# Patient Record
Sex: MALE | Race: WHITE | NOT HISPANIC OR LATINO | Employment: OTHER | URBAN - METROPOLITAN AREA
[De-identification: names, ages, dates, MRNs, and addresses within clinical notes are randomized per-mention and may not be internally consistent; named-entity substitution may affect disease eponyms.]

---

## 2017-04-05 NOTE — PROGRESS NOTES
SUBJECTIVE:                                                    Hermelindo Conley is a 57 year old male who presents to clinic today for the following health issues:    Pt new to clinic. Previous care at [facility] - see Care Everywhere    Musculoskeletal problem/pain      Duration: 1.5 years ago     Description  Location: right shoulder    Intensity:  severe    Accompanying signs and symptoms: radiation of pain to mid humerus    History  Previous similar problem: YES- left shoulder  Previous evaluation:  none    Precipitating or alleviating factors:  Trauma or overuse: no   Aggravating factors include: lifting, exercise and overuse    Therapies tried and outcome: rest/inactivity, heat and ice    5-6 years ago, fell of of a 10 foot ladder - landed on top of scalp and split open a large area. Stapled scalp back together. Was told had pinched nerves in neck. Went to rehab and continued to have left shoulder pain. Had EMG of arm - had no feeling in left hand. Showed problem with ulnar nerve. Saw orthopedics and rerouted ulnar nerve and cleaned up elbow. Symptoms improved. Continued to have left shoulder pain. Tried steroid injections with Dr. Barth. Had PT.  Had MRI and found inflammation in shoulder thought due to fall. Had shoulder cleaned out at [employer]. Left shoulder has been fine since that time.    About 3 weeks ago, right shoulder started similar to how left started. Has been gradually worsening and now having difficulty putting coat on. Noticed in morning with lateral humeral pain. If moves in a certain way, pain radiates down arm.      smoking cessation: used patches for a long time and quit for 1.5-2 years. And then restarted smoking. Now smoking about 1 pack a day. Started in college in late 70's. Smoked about 4-5/day until last year.     Sleep - sleeps for 1.5-2 hours, awake for 2-3 hours and then goes back to sleep. Was started on trazodone in the past and knocked out. Was not tired in the morning.  "Doesn't snore. Was on fairly high dose Mother with history of sleep apnea.    Reviewed and updated as needed this visit by clinical staff  Tobacco  Allergies  Meds  Problems  Med Hx  Surg Hx  Fam Hx  Soc Hx        Reviewed and updated as needed this visit by Provider  Allergies  Meds  Problems       -------------------------------------    Problem list and histories reviewed & adjusted, as indicated.  Additional history: as documented    ROS:  Constitutional, HEENT, cardiovascular, pulmonary, endocrine, musculoskeletal, neurologic, gi and gu systems are negative, except as otherwise noted.    Problem list, Medication list, Allergies, and Medical/Social/Surgical histories reviewed in [facility] and updated as appropriate.    OBJECTIVE:                                                    /72 (BP Location: Right arm, Patient Position: Chair, Cuff Size: Adult Regular)  Pulse 77  Temp 97  F (36.1  C) (Tympanic)  Ht 5' 8.5\" (1.74 m)  Wt 149 lb 3.2 oz (67.7 kg)  SpO2 99%  BMI 22.36 kg/m2   Body mass index is 22.36 kg/(m^2).  General Appearance: healthy, alert and no distress  Eyes:   no discharge, erythema.  Normal pupils.  Musculoskeletal: right shoulder with decreased internal rotation. Tenderness to palpation over lateral upper arm. Pain worse with pulling arm across body. Normal strength, but pain with testing supraspinatus.  Skin: no rashes or lesions.  Well perfused and normal turgor.    Diagnostic Test Results:  none      ASSESSMENT/PLAN:                                                      (Z72.0) Tobacco abuse  (primary encounter diagnosis)  Comment: interested in quitting.   Plan: varenicline (CHANTIX STARTING MONTH PAK) 0.5 MG        X 11 & 1 MG X 42 tablet, varenicline (CHANTIX)         1 MG tablet  - discussed options for smoking cessation  - pt would like to start chantix - discussed possible side effects. Will pick quit date and start medication 1 week prior    (F51.01) Primary " insomnia  Comment: not well controlled, did well with trazodone in past but using fairly large doses  Plan: traZODone (DESYREL) 50 MG tablet  - will start  mg at bedtime  - if necessary, can increase to 150 or 200 mg    (S46.001A) Rotator cuff injury, right, initial encounter  Comment: symptoms most c/w rotator cuff injury, but could also be due to pinched nerve in neck with pain radiating down arm. Exam more c/w shoulder pathology. Did well with injection on other side.  Plan: ORTHO  REFERRAL  - will have see sports medicine for eval and possible injection     Tobacco Cessation:   reports that he has been smoking Cigarettes.  He does not have any smokeless tobacco history on file.  Tobacco Cessation Action Plan: Pharmacotherapies : Chantix      Follow up with Provider - for annual visit and as needed     [facility] [place]

## 2017-04-06 ENCOUNTER — OFFICE VISIT (OUTPATIENT)
Dept: PEDIATRICS | Facility: CLINIC | Age: 57
End: 2017-04-06
Payer: COMMERCIAL

## 2017-04-06 VITALS
DIASTOLIC BLOOD PRESSURE: 72 MMHG | WEIGHT: 149.2 LBS | OXYGEN SATURATION: 99 % | HEIGHT: 69 IN | SYSTOLIC BLOOD PRESSURE: 122 MMHG | TEMPERATURE: 97 F | HEART RATE: 77 BPM | BODY MASS INDEX: 22.1 KG/M2

## 2017-04-06 DIAGNOSIS — S46.001A ROTATOR CUFF INJURY, RIGHT, INITIAL ENCOUNTER: ICD-10-CM

## 2017-04-06 DIAGNOSIS — Z72.0 TOBACCO ABUSE: Primary | ICD-10-CM

## 2017-04-06 DIAGNOSIS — F51.01 PRIMARY INSOMNIA: ICD-10-CM

## 2017-04-06 PROCEDURE — 99203 OFFICE O/P NEW LOW 30 MIN: CPT | Performed by: INTERNAL MEDICINE

## 2017-04-06 RX ORDER — TRAZODONE HYDROCHLORIDE 50 MG/1
50-100 TABLET, FILM COATED ORAL
Qty: 60 TABLET | Refills: 4 | Status: SHIPPED | OUTPATIENT
Start: 2017-04-06 | End: 2018-09-13

## 2017-04-06 RX ORDER — VARENICLINE TARTRATE 1 MG/1
1 TABLET, FILM COATED ORAL 2 TIMES DAILY
Qty: 56 TABLET | Refills: 2 | Status: SHIPPED | OUTPATIENT
Start: 2017-04-06 | End: 2018-08-30

## 2017-04-06 NOTE — PATIENT INSTRUCTIONS
1. For smoking  - pick quit date - start chantix 1 week before quit date  - can continue 3-6 months if needed  2. Trazodone  mg at bedtime as needed  3. You will be contacted to set up an appointment with sports medicine to evaluation shoulder and for possible injection

## 2017-04-06 NOTE — MR AVS SNAPSHOT
After Visit Summary   4/6/2017    Hermelindo Conley    MRN: [id number]           Patient Information     Date Of Birth          1960        Visit Information        Provider Department      4/6/2017 10:40 AM Aditi Coronel MD [facility] [place]        Today's Diagnoses     Tobacco abuse    -  1    Primary insomnia        Rotator cuff injury, right, initial encounter          Care Instructions    1. For smoking  - pick quit date - start chantix 1 week before quit date  - can continue 3-6 months if needed  2. Trazodone  mg at bedtime as needed  3. You will be contacted to set up an appointment with sports medicine to evaluation shoulder and for possible injection        Follow-ups after your visit        Additional Services     ORTHO  REFERRAL       [facility] is referring you to the Orthopedic  Services at [facility].       The  Representative will assist you in the coordination of your Orthopedic and Musculoskeletal Care as prescribed by your physician.    The  Representative will call you within 1 business day to help schedule your appointment, or you may contact the  Representative at:    All areas ~ [phone]     Type of Referral : Non Surgical       Timeframe requested: Within 2 weeks    Coverage of these services is subject to the terms and limitations of your health insurance plan.  Please call member services at your health plan with any benefit or coverage questions.      If X-rays, CT or MRI's have been performed, please contact the facility where they were done to arrange for , prior to your scheduled appointment.  Please bring this referral request to your appointment and present it to your specialist.                  Who to contact     If you have questions or need follow up information about today's clinic visit or your schedule please contact [facility] [place] directly  "at [phone].  Normal or non-critical lab and imaging results will be communicated to you by MyChart, letter or phone within 4 business days after the clinic has received the results. If you do not hear from us within 7 days, please contact the clinic through [employer] or phone. If you have a critical or abnormal lab result, we will notify you by phone as soon as possible.  Submit refill requests through [employer] or call your pharmacy and they will forward the refill request to us. Please allow 3 business days for your refill to be completed.          Additional Information About Your Visit        [email or web address] ([email or web address]) Information     [employer] lets you send messages to your doctor, view your test results, renew your prescriptions, schedule appointments and more. To sign up, go to [email or web address] . Click on \"Log in\" on the left side of the screen, which will take you to the Welcome page. Then click on \"Sign up Now\" on the right side of the page.     You will be asked to enter the access code listed below, as well as some personal information. Please follow the directions to create your username and password.     Your access code is: [email or web address]  Expires: 2017 11:20 AM     Your access code will  in 90 days. If you need help or a new code, please call your [facility] clinic or [phone].        Care EveryWhere ID     This is your Care EveryWhere ID. This could be used by other organizations to access your [facility] medical records  [id number]        Your Vitals Were     Pulse Temperature Height Pulse Oximetry BMI (Body Mass Index)       77 97  F (36.1  C) (Tympanic) 5' 8.5\" (1.74 m) 99% 22.36 kg/m2        Blood Pressure from Last 3 Encounters:   17 122/72   14 135/83    Weight from Last 3 Encounters:   17 149 lb 3.2 oz (67.7 kg)              We Performed the Following     ORTHO  REFERRAL          Today's Medication Changes          These changes are accurate as of: 17 11:20 AM.  If " you have any questions, ask your nurse or doctor.               Start taking these medicines.        Dose/Directions    traZODone 50 MG tablet   Commonly known as:  DESYREL   Used for:  Primary insomnia   Started by:  Aditi Coronel MD        Dose:   mg   Take 1-2 tablets ( mg) by mouth nightly as needed for sleep   Quantity:  60 tablet   Refills:  4       * varenicline 0.5 MG X 11 & 1 MG X 42 tablet   Commonly known as:  CHANTIX STARTING MONTH PAK   Used for:  Tobacco abuse   Started by:  Aditi Coronel MD        Take 0.5 mg tab daily for 3 days, then 0.5 mg tab twice daily for 4 days, then 1 mg twice daily.   Quantity:  53 tablet   Refills:  0       * varenicline 1 MG tablet   Commonly known as:  CHANTIX   Used for:  Tobacco abuse   Started by:  Aditi Coronel MD        Dose:  1 mg   Take 1 tablet (1 mg) by mouth 2 times daily   Quantity:  56 tablet   Refills:  2       * Notice:  This list has 2 medication(s) that are the same as other medications prescribed for you. Read the directions carefully, and ask your doctor or other care provider to review them with you.         Where to get your medicines      These medications were sent to [place] [address] [place] [facility] [place], [place] - [address] [facility]  [address] [facility] [place] [address]     Phone:  [phone]     traZODone 50 MG tablet    varenicline 0.5 MG X 11 & 1 MG X 42 tablet    varenicline 1 MG tablet                Primary Care Provider Office Phone # Fax #    Aditi Coronel -[phone]       [address] [place] [address]        Thank you!     Thank you for choosing [facility]  for your care. Our goal is always to provide you with excellent care. Hearing back from our patients is one way we can continue to improve our services. Please take a few minutes to complete the written survey that you may receive in the mail after your visit with us. Thank you!              Your Updated Medication List - Protect others around you: Learn how to safely use, store and throw away your medicines at www.disposemymeds.org.          This list is accurate as of: 4/6/17 11:20 AM.  Always use your most recent med list.                   Brand Name Dispense Instructions for use    traZODone 50 MG tablet    DESYREL    60 tablet    Take 1-2 tablets ( mg) by mouth nightly as needed for sleep       * varenicline 0.5 MG X 11 & 1 MG X 42 tablet    CHANTIX STARTING MONTH MYNOR    53 tablet    Take 0.5 mg tab daily for 3 days, then 0.5 mg tab twice daily for 4 days, then 1 mg twice daily.       * varenicline 1 MG tablet    CHANTIX    56 tablet    Take 1 tablet (1 mg) by mouth 2 times daily       * Notice:  This list has 2 medication(s) that are the same as other medications prescribed for you. Read the directions carefully, and ask your doctor or other care provider to review them with you.

## 2017-04-08 ENCOUNTER — RADIANT APPOINTMENT (OUTPATIENT)
Dept: GENERAL RADIOLOGY | Facility: CLINIC | Age: 57
End: 2017-04-08
Attending: FAMILY MEDICINE
Payer: COMMERCIAL

## 2017-04-08 ENCOUNTER — OFFICE VISIT (OUTPATIENT)
Dept: ORTHOPEDICS | Facility: CLINIC | Age: 57
End: 2017-04-08
Payer: COMMERCIAL

## 2017-04-08 VITALS
WEIGHT: 150 LBS | BODY MASS INDEX: 22.22 KG/M2 | DIASTOLIC BLOOD PRESSURE: 76 MMHG | SYSTOLIC BLOOD PRESSURE: 122 MMHG | HEIGHT: 69 IN

## 2017-04-08 DIAGNOSIS — M25.511 RIGHT SHOULDER PAIN, UNSPECIFIED CHRONICITY: ICD-10-CM

## 2017-04-08 DIAGNOSIS — M25.511 CHRONIC RIGHT SHOULDER PAIN: Primary | ICD-10-CM

## 2017-04-08 DIAGNOSIS — G89.29 CHRONIC RIGHT SHOULDER PAIN: Primary | ICD-10-CM

## 2017-04-08 PROCEDURE — 99244 OFF/OP CNSLTJ NEW/EST MOD 40: CPT | Performed by: FAMILY MEDICINE

## 2017-04-08 PROCEDURE — 73030 X-RAY EXAM OF SHOULDER: CPT | Mod: RT

## 2017-04-08 NOTE — PROGRESS NOTES
"ASSESSMENT & PLAN    ICD-10-CM    1. Chronic right shoulder pain M25.511 XR Shoulder Right G/E 3 Views    G89.29 MR Shoulder Right w/o Contrast   Presents with chronic right shoulder pain without a specific injury  Exam does show some cuff weakness and ? Supraspinatus atrophy  Xrays reviewed - well preserved GH space  Discussed given chronicity and cuff weakness will access with MRI and determine next steps.  Trail of cortisone and PT vs surgical referral.    Follow up 2 days after MRI to discuss results or sooner if needed. Call direct clinic number [phone] at any time with questions or concerns. Instructed to call the office if the condition evolves or worsens.    -----    SUBJECTIVE  Hermelindo Conley is a/an 57 year old right hand dominant male who is seen in consultation at the request of Dr. Coronel for evaluation of right lateral shoulder pain. The patient is seen by themselves.    Onset: 1.5 month(s) ago. Reports insidious onset without acute precipitating event. He reports waking up with pain one morning.  Worsened by: lifting, getting dressed, sleeping on it  Better with: at the side  Quality: sharp, burning  Pain Scale (maximum/current)/10: 8/10 / 9/10  Treatments tried: rest/activity avoidance, ice and heat  Orthopedic history: NO  Relevant surgical history: NO  Patient Social History: works as [employer] and [employer]    Patient's past medical, surgical, social, and family histories were reviewed today and no changes are noted.    REVIEW OF SYSTEMS:  10 point ROS is negative other than symptoms noted above in HPI, Past Medical History or as stated below  Constitutional: NEGATIVE for fever, chills, change in weight  Skin: NEGATIVE for worrisome rashes, moles or lesions  GI/: NEGATIVE for bowel or bladder changes  Neuro: NEGATIVE for weakness, dizziness or paresthesias    OBJECTIVE:  Ht 5' 9\" (1.753 m)  Wt 150 lb (68 kg)  BMI 22.15 kg/m2   General: healthy, alert and in no distress  HEENT: no scleral icterus " or conjunctival erythema  Skin: no suspicious lesions or rash. No jaundice.  CV: regular rhythm by palpation  Resp: normal respiratory effort without conversational dyspnea   Psych: normal mood and affect  Gait: normal steady gait with appropriate coordination and balance  Neuro: Motor strength as noted below.  MSK:  RIGHT SHOULDER  Inspection:    no asymmetry, mild supraspinatus atrophy  Palpation:    Pain along deltoid. Remainder of bony and tendinous landmarks are nontender.  Active Range of Motion:     Abduction 900, FF 1050, , IR very limited.    Strength:    Scapular plane abduction 4+/5,  ER 5/5, IR 5/5, biceps 5/5  Special Tests:    Positive: supraspinatus (empty can) and Speed's, hawkings    Negative: drop arm and crossed arm adduction    Independent visualization of the below image:  Well preserved glenohumeral and a.c. joint joint. Hooked acromion.  No other acute osseous findings  Final radiology read pending    Patient's conditions were thoroughly discussed during today's visit with greater than 50% of the visit spent counseling the patient with total time spent face-to-face with the patient being 25 minutes.    Mykel De La Rosa DO [facility]

## 2017-04-08 NOTE — MR AVS SNAPSHOT
After Visit Summary   4/8/2017    Hermelindo Conley    MRN: [id number]           Patient Information     Date Of Birth          1960        Visit Information        Provider Department      4/8/2017 9:20 AM Mykel De La Rosa DO [facility]        Today's Diagnoses     Chronic right shoulder pain    -  1      Care Instructions    Thank you for allowing us to participate in your care today.  Please find below your visit diagnosis and the plan going forward.    1. Chronic right shoulder pain      Discussed exam and history  MRI of your right shoulder has been ordered. Schedule with [facility] ([phone]). Once you know the date of your MRI, please call my office and schedule a follow-up visit for 2 days after that.    Follow up to discuss results of MRI or sooner if needed. Call direct clinic number [[phone]] at any time with questions or concerns.    Mykel De La Rosa DO CAQSM  [facility]  Website: [email or web address]  Twitter: [email or web address]          Follow-ups after your visit        Future tests that were ordered for you today     Open Future Orders        Priority Expected Expires Ordered    MR Shoulder Right w/o Contrast Routine  4/9/2018 4/8/2017            Who to contact     If you have questions or need follow up information about today's clinic visit or your schedule please contact [facility] directly at [phone].  Normal or non-critical lab and imaging results will be communicated to you by MyChart, letter or phone within 4 business days after the clinic has received the results. If you do not hear from us within 7 days, please contact the clinic through MyChart or phone. If you have a critical or abnormal lab result, we will notify you by phone as soon as possible.  Submit refill requests through [employer] or call your pharmacy and they will forward the refill request to us. Please allow 3 business days for  "your refill to be completed.          Additional Information About Your Visit        [employer] Information     [employer] lets you send messages to your doctor, view your test results, renew your prescriptions, schedule appointments and more. To sign up, go to [email or web address] . Click on \"Log in\" on the left side of the screen, which will take you to the Welcome page. Then click on \"Sign up Now\" on the right side of the page.     You will be asked to enter the access code listed below, as well as some personal information. Please follow the directions to create your username and password.     Your access code is: [email or web address]  Expires: 2017 11:20 AM     Your access code will  in 90 days. If you need help or a new code, please call your [facility] clinic or [phone].        Care EveryWhere ID     This is your Care EveryWhere ID. This could be used by other organizations to access your [facility] medical records  [id number]        Your Vitals Were     Height BMI (Body Mass Index)                5' 9\" (1.753 m) 22.15 kg/m2           Blood Pressure from Last 3 Encounters:   17 122/76   17 122/72   14 135/83    Weight from Last 3 Encounters:   17 150 lb (68 kg)   17 149 lb 3.2 oz (67.7 kg)               Primary Care Provider Office Phone # Fax #    Aditi Coronel [phone]       [facility] [address] [place] [address]        Thank you!     Thank you for choosing [facility]  for your care. Our goal is always to provide you with excellent care. Hearing back from our patients is one way we can continue to improve our services. Please take a few minutes to complete the written survey that you may receive in the mail after your visit with us. Thank you!             Your Updated Medication List - Protect others around you: Learn how to safely use, store and throw away your medicines at www.disposemymeds.org. "          This list is accurate as of: 4/8/17  9:49 AM.  Always use your most recent med list.                   Brand Name Dispense Instructions for use    traZODone 50 MG tablet    DESYREL    60 tablet    Take 1-2 tablets ( mg) by mouth nightly as needed for sleep       * varenicline 0.5 MG X 11 & 1 MG X 42 tablet    CHANTIX STARTING MONTH MYNOR    53 tablet    Take 0.5 mg tab daily for 3 days, then 0.5 mg tab twice daily for 4 days, then 1 mg twice daily.       * varenicline 1 MG tablet    CHANTIX    56 tablet    Take 1 tablet (1 mg) by mouth 2 times daily       * Notice:  This list has 2 medication(s) that are the same as other medications prescribed for you. Read the directions carefully, and ask your doctor or other care provider to review them with you.

## 2017-04-08 NOTE — NURSING NOTE
"Chief Complaint   Patient presents with     Musculoskeletal Problem       Initial /76  Ht 5' 9\" (1.753 m)  Wt 150 lb (68 kg)  BMI 22.15 kg/m2 Estimated body mass index is 22.15 kg/(m^2) as calculated from the following:    Height as of this encounter: 5' 9\" (1.753 m).    Weight as of this encounter: 150 lb (68 kg).  Medication Reconciliation: complete     Jose Brooks, ATC    "

## 2017-04-08 NOTE — PATIENT INSTRUCTIONS
Thank you for allowing us to participate in your care today.  Please find below your visit diagnosis and the plan going forward.    1. Chronic right shoulder pain      Discussed exam and history  MRI of your right shoulder has been ordered. Schedule with [facility] ([phone]). Once you know the date of your MRI, please call my office and schedule a follow-up visit for 2 days after that.    Follow up to discuss results of MRI or sooner if needed. Call direct clinic number [[phone]] at any time with questions or concerns.    Mykel De La Rosa DO CAQSM  [facility]  Website: [email or web address]  Twitter: [email or web address]

## 2017-04-08 NOTE — Clinical Note
Hermelindo Liz Dr. saw me at [facility] on Apr 8, 2017.  Please refer to the visit note at your convenience and feel free to contact me should you have any questions.  Thank you for the consult. Sincerely,  Mykel De La Rosa DO, [facility]

## 2017-04-09 ENCOUNTER — HOSPITAL ENCOUNTER (OUTPATIENT)
Dept: MRI IMAGING | Facility: CLINIC | Age: 57
Discharge: HOME | End: 2017-04-09
Attending: FAMILY MEDICINE | Admitting: FAMILY MEDICINE
Payer: COMMERCIAL

## 2017-04-09 DIAGNOSIS — G89.29 CHRONIC RIGHT SHOULDER PAIN: ICD-10-CM

## 2017-04-09 DIAGNOSIS — M25.511 CHRONIC RIGHT SHOULDER PAIN: ICD-10-CM

## 2017-04-09 PROCEDURE — 73221 MRI JOINT UPR EXTREM W/O DYE: CPT | Mod: RT

## 2017-04-13 ENCOUNTER — OFFICE VISIT (OUTPATIENT)
Dept: ORTHOPEDICS | Facility: CLINIC | Age: 57
End: 2017-04-13
Payer: COMMERCIAL

## 2017-04-13 VITALS
WEIGHT: 150 LBS | SYSTOLIC BLOOD PRESSURE: 124 MMHG | BODY MASS INDEX: 22.22 KG/M2 | HEIGHT: 69 IN | DIASTOLIC BLOOD PRESSURE: 74 MMHG

## 2017-04-13 DIAGNOSIS — G89.29 CHRONIC RIGHT SHOULDER PAIN: Primary | ICD-10-CM

## 2017-04-13 DIAGNOSIS — M25.511 CHRONIC RIGHT SHOULDER PAIN: Primary | ICD-10-CM

## 2017-04-13 DIAGNOSIS — M50.30 DEGENERATIVE CERVICAL DISC: ICD-10-CM

## 2017-04-13 PROCEDURE — 99213 OFFICE O/P EST LOW 20 MIN: CPT | Performed by: FAMILY MEDICINE

## 2017-04-13 RX ORDER — METHYLPREDNISOLONE ACETATE 40 MG/ML
40 INJECTION, SUSPENSION INTRA-ARTICULAR; INTRALESIONAL; INTRAMUSCULAR; SOFT TISSUE ONCE
Qty: 1 ML | Refills: 0 | Status: CANCELLED | OUTPATIENT
Start: 2017-04-13 | End: 2017-04-13

## 2017-04-13 NOTE — MR AVS SNAPSHOT
After Visit Summary   4/13/2017    Hermelindo Conley    MRN: [id number]           Patient Information     Date Of Birth          1960        Visit Information        Provider Department      4/13/2017 8:40 AM Mykel De La Rosa DO [facility]        Today's Diagnoses     Chronic right shoulder pain    -  1    Degenerative cervical disc          Care Instructions    Thank you for allowing us to participate in your care today.  Please find below your visit diagnosis and the plan going forward.    1. Chronic right shoulder pain    2. Degenerative cervical disc      Reviewed MRI - no cuff tear and no bursitis  Would not recommend cortisone injection into the shoulder  Reviewed old cervical CT and would recommend physical therapy for the cervical spine  Physical therapy: [facility] [phone]    If not improving could consider imaging for the neck    Follow up after 4-6 visits of therapy if not improving or sooner if needed. Call direct clinic number [[phone]] at any time with questions or concerns.    Mykel De La Rosa DO [facility]  Website: [email or web address]  Twitter: [email or web address]          Follow-ups after your visit        Additional Services     [facility], HAND, AND CHIROPRACTIC REFERRAL       **This order will print in the [facility] Scheduling Office**    Physical Therapy, Hand Therapy and Chiropractic Care are available through:    *[facility]  *[facility]  *[facility]    Call one number to schedule at any of the above locations: [phone].    Your provider has referred you to: Physical Therapy at [facility] or [facility] [place]    Indication/Reason for Referral: Perceives right shoulder pain but cervical degenerative change on CT, negative shoulder MRI, please address cervical spine  Onset of Illness: see chart  Therapy Orders: Evaluate and Treat  Special Programs:  "None  Special Request: None    Alessandra Aparicio      Additional Comments for the Therapist or Chiropractor:       Please be aware that coverage of these services is subject to the terms and limitations of your health insurance plan.  Call member services at your health plan with any benefit or coverage questions.      Please bring the following to your appointment:    *Your personal calendar for scheduling future appointments  *Comfortable clothing                  Who to contact     If you have questions or need follow up information about today's clinic visit or your schedule please contact [facility] directly at [phone].  Normal or non-critical lab and imaging results will be communicated to you by [employer], letter or phone within 4 business days after the clinic has received the results. If you do not hear from us within 7 days, please contact the clinic through [employer] or phone. If you have a critical or abnormal lab result, we will notify you by phone as soon as possible.  Submit refill requests through [employer] or call your pharmacy and they will forward the refill request to us. Please allow 3 business days for your refill to be completed.          Additional Information About Your Visit        [employer] Information     [employer] lets you send messages to your doctor, view your test results, renew your prescriptions, schedule appointments and more. To sign up, go to [email or web address] . Click on \"Log in\" on the left side of the screen, which will take you to the Welcome page. Then click on \"Sign up Now\" on the right side of the page.     You will be asked to enter the access code listed below, as well as some personal information. Please follow the directions to create your username and password.     Your access code is: [email or web address]  Expires: 2017 11:20 AM     Your access code will  in 90 days. If you need help or a new code, please call your [facility] clinic or " "[phone].        Care EveryWhere ID     This is your Care EveryWhere ID. This could be used by other organizations to access your [place] medical records  [id number]        Your Vitals Were     Height BMI (Body Mass Index)                5' 9\" (1.753 m) 22.15 kg/m2           Blood Pressure from Last 3 Encounters:   04/13/17 124/74   04/08/17 122/76   04/06/17 122/72    Weight from Last 3 Encounters:   04/13/17 150 lb (68 kg)   04/08/17 150 lb (68 kg)   04/06/17 149 lb 3.2 oz (67.7 kg)              We Performed the Following     [facility], HAND, AND CHIROPRACTIC REFERRAL        Primary Care Provider Office Phone # Fax #    Aditi Coronel [phone]       [address] [place] [address]        Thank you!     Thank you for choosing [facility]  for your care. Our goal is always to provide you with excellent care. Hearing back from our patients is one way we can continue to improve our services. Please take a few minutes to complete the written survey that you may receive in the mail after your visit with us. Thank you!             Your Updated Medication List - Protect others around you: Learn how to safely use, store and throw away your medicines at www.disposemymeds.org.          This list is accurate as of: 4/13/17  9:08 AM.  Always use your most recent med list.                   Brand Name Dispense Instructions for use    traZODone 50 MG tablet    DESYREL    60 tablet    Take 1-2 tablets ( mg) by mouth nightly as needed for sleep       * varenicline 0.5 MG X 11 & 1 MG X 42 tablet    CHANTIX STARTING MONTH MYNOR    53 tablet    Take 0.5 mg tab daily for 3 days, then 0.5 mg tab twice daily for 4 days, then 1 mg twice daily.       * varenicline 1 MG tablet    CHANTIX    56 tablet    Take 1 tablet (1 mg) by mouth 2 times daily       * Notice:  This list has 2 medication(s) that are the same as other medications prescribed for you. " Read the directions carefully, and ask your doctor or other care provider to review them with you.

## 2017-04-13 NOTE — PATIENT INSTRUCTIONS
Thank you for allowing us to participate in your care today.  Please find below your visit diagnosis and the plan going forward.    1. Chronic right shoulder pain    2. Degenerative cervical disc      Reviewed MRI - no cuff tear and no bursitis  Would not recommend cortisone injection into the shoulder  Reviewed old cervical CT and would recommend physical therapy for the cervical spine  Physical therapy: [facility] [phone]    If not improving could consider imaging for the neck    Follow up after 4-6 visits of therapy if not improving or sooner if needed. Call direct clinic number [[phone]] at any time with questions or concerns.    Mykel De La Rosa DO CAQSM  [facility]  Website: [email or web address]  Twitter: [email or web address]

## 2017-04-13 NOTE — PROGRESS NOTES
ASSESSMENT & PLAN    ICD-10-CM    1. Chronic right shoulder pain M25.511 [facility], AND CHIR[facility]RACTIC REFERRAL    G89.29    2. Degenerative cervical disc M50.30 [facility], AND CHIR[facility]RACTIC REFERRAL   Discussed MRI which shows moderate supraspinatus tendinopathy without any tearing. No subacromial bursitis and a well-preserved glenohumeral joint space.  Given these findings would not recommend cortisone injection, either subacromial or glenohumeral today.  He continues to report 8/10 pain down the lateral right arm.  Further discussed his known history of neck pain that had some radicular component on the left arm after his fall in 2014. At that time he had no right arm pain.  Question if the more recent right arm pain is more cervicogenic in nature.  Reviewed his CT neck which does show moderate degenerative change at C6-C7. C5-C6 looks fairly well preserved at that time.  Discussed a trial of physical therapy directed more towards the cervical spine. If he has minimal to no improvement could consider MRI of the neck to assess for progressive degenerative disc disease and for interventional consideration.    Follow up after 4-6 visits of therapy if not improving or sooner if needed. Call direct clinic number [[phone]] at any time with questions or concerns. Instructed to call the office if the condition evolves or worsens.    -----    SUBJECTIVE:  Hermelindo Conley is a 57 year old male who is seen in follow-up for chronic right shoulder pain . They were last seen 4/8/2017.     Since their last visit reports worsening pain. They indicate that their pain level is 8/10. They have tried heat.  Here to discuss MRI results.    Patient's past medical, surgical, social, and family histories were reviewed today and no changes are noted.    REVIEW OF SYSTEMS:  Constitutional: NEGATIVE for fever, chills, change in weight  Skin: NEGATIVE for worrisome rashes, moles or lesions  GI/: NEGATIVE for bowel or bladder  "changes  Neuro: NEGATIVE for weakness, dizziness or paresthesias    OBJECTIVE:  /74  Ht 5' 9\" (1.753 m)  Wt 150 lb (68 kg)  BMI 22.15 kg/m2   General: healthy, alert and in no distress  HEENT: no scleral icterus or conjunctival erythema  Skin: no suspicious lesions or rash. No jaundice.  CV: regular rhythm by palpation  Resp: normal respiratory effort without conversational dyspnea   Psych: normal mood and affect  Gait: normal steady gait with appropriate coordination and balance  Neuro: normal light touch sensory exam of the bilateral upper extremities.  Motor strength as noted below  MSK:  CERVICAL SPINE  Inspection:    Rounded shoulder, forward head  Palpation:    NonTender about the cervical spinous processes, paracervical musculature (bilateral) and medial border of scapula. Otherwise remainder of the landmarks and nontender.  Range of Motion:     Flexion full    Extension to ~5 degrees    Right and left side bend to ~10 degrees    Right rotation to ~ 30 degrees    Left rotation to ~ 20 degrees  Strength:    Deltoid (C5) 5/5, biceps (C6) 5/5, wrist extension (C6) 5/5, triceps (C7) 5/5, wrist flexion (C7) 5/5  Special Tests:    Negative: Spurling's (bilateral)    Independent visualization of the below image:  MRI RIGHT SHOULDER WITHOUT CONTRAST 4/9/2017 9:19 AM     HISTORY: Two months of right shoulder pain and loss of strength. The  concern is for rotator cuff pathology.     COMPARISON: Radiographs on 4/8/2017.     TECHNIQUE: Coronal T1, T2, and T2 fat-suppressed, sagittal T2, and  transverse proton density and T2-weighted images were obtained through  the right shoulder.     FINDINGS:  Osseous acromion outlet: There is a type II configuration of the  acromion with no significant lateral or anterior downsloping. There  are mild degenerative changes of the acromioclavicular joint. The  outlet is widely patent. No os acromiale.     Rotator cuff: There is moderate increased signal within and " thickening  of the distal fibers of the supraspinatus tendon. This does not  involve the surface of the tendon and does not represent a tear. The  remaining rotator cuff structures are intact and unremarkable. No  fatty atrophy of the musculature is seen.      Labrum: No tear or other labral pathology is demonstrated.     Biceps tendon: The biceps tendon is in the bicipital groove. It is  intact and demonstrates normal signal.     Osseous structures and cartilaginous surfaces: No fracture or osseous  lesion is seen. There is moderate resorptive change in the posterior  aspect of the greater tuberosity. No other abnormal marrow signal  intensity is identified. The cartilage surfaces are well preserved.     Additional findings: No joint effusion is demonstrated. There is no  fluid within the subacromial-subdeltoid bursa. The adjacent soft  tissues are unremarkable.     IMPRESSION:   1. Moderate tendinosis of the supraspinatus. No rotator cuff tear is  seen.  2. Mild degenerative changes of the acromioclavicular joint.     PAVAN HOLBROOK MD    Patient's conditions were thoroughly discussed during today's visit with greater than 50% of the visit spent counseling the patient with total time spent face-to-face with the patient being 20 minutes.    Mykel De La Rosa, DO [facility]

## 2017-04-13 NOTE — NURSING NOTE
"Chief Complaint   Patient presents with     RECHECK       Initial /74  Ht 5' 9\" (1.753 m)  Wt 150 lb (68 kg)  BMI 22.15 kg/m2 Estimated body mass index is 22.15 kg/(m^2) as calculated from the following:    Height as of this encounter: 5' 9\" (1.753 m).    Weight as of this encounter: 150 lb (68 kg).  Medication Reconciliation: complete       Jose Brooks, ATC    "

## 2017-04-17 ENCOUNTER — THERAPY VISIT (OUTPATIENT)
Dept: PHYSICAL THERAPY | Facility: CLINIC | Age: 57
End: 2017-04-17
Payer: COMMERCIAL

## 2017-04-17 DIAGNOSIS — M25.511 PAIN IN JOINT OF RIGHT SHOULDER: Primary | ICD-10-CM

## 2017-04-17 DIAGNOSIS — M47.812 CERVICAL SPINE DEGENERATION: ICD-10-CM

## 2017-04-17 PROCEDURE — 97140 MANUAL THERAPY 1/> REGIONS: CPT | Mod: GP | Performed by: PHYSICAL THERAPIST

## 2017-04-17 PROCEDURE — 97110 THERAPEUTIC EXERCISES: CPT | Mod: GP | Performed by: PHYSICAL THERAPIST

## 2017-04-17 PROCEDURE — 97161 PT EVAL LOW COMPLEX 20 MIN: CPT | Mod: GP | Performed by: PHYSICAL THERAPIST

## 2017-04-17 NOTE — PROGRESS NOTES
Hermelindo Conley is a 57 year old male patient presenting to Physical Therapy with the following Primary Symptoms: Right sided lateral upper arm, lateral forearm, lateral hand and small finger. These pains are described as intermittent.   He denies having painful cough/sneeze, saddle anaesthesia, severe night pain, peripheral motor deficit, recent bowel/bladder change, recent vision change, ringing in the ears or pain with swallowing. Pain is rated 0/10 at rest, and 7/10 at worst. History of symptoms:  January 2014, fell off of ladder, landed on his head, to ED,  Recent onset about 1-2 months ago, awoke with pain in the right shoulder These pains began suddenly as result of no specific episode or injury.  Previous treatment has included nothing.   Since onset, these pains are getting worse :  Current Symptoms are worsened by: reaching into cupboards, putting on shirt/sleeve, washing hair, washing back. Current Symptoms are relieved by heating pad.   Recent surgical procedure: none in right shouder or neck/head.   General health as reported by patient is:  good.  Pertinent medical history: Hx of L ulnar nerve transfer, L shoulder arthroscopic decompression.  He denies any significant current illness or recent hospital admissions. He denies any regonal implanted devices.  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 Previous functional status:  fully functional prior to pain onset/injury.       :    HPI                    Objective:    System              Cervical/Thoracic Evaluation    AROM:  AROM Cervical:    Flexion:            NL  Extension:       50%  Rotation:         Left: 25%     Right: 50%  Side Bend:      Left: 25%     Right:  25%      Headaches: none  Cervical Myotomes:  normal                  DTR's:  normal            Cervical Palpation:        Tenderness not present at Right:      Scalenes; Rhomboids; Upper Trap or Levator  Functional Tests:  Core strength  and proprioception spine wnl: neg for symptom provocation with supine head PROM all planes.    Cervical Stability/Joint Clearing:      Left negative at: VAT    Right negative at:  VAT    Cord Sign:  not assessed         Shoulder Evaluation:  ROM:  AROM:    Flexion:  Right:  140    Abduction:  Right:  85      External Rotation:  Right:  50                PROM:    Flexion:  Right: end range pain      Abduction:  Right:  End range pain      External Rotation:  Right:  53 end range pain                        Special Tests:      Right shoulder positive for the following special tests:Impingement (HK, and NEER)  Right shoulder negative for the following special tests:Rotator cuff tear  Palpation:      Right shoulder tenderness present at: Supraspinatus and Bicipital Groove  Mobility Tests:  Mobility wnl shoulder: crepitus and apprehension with R load and shift testing.              Scapulohumeral rhythm right:  Hypomobile                                   General     ROS    Assessment/Plan:      Patient is a 57 year old male with cervical and right side shoulder complaints.    Patient has the following significant findings with corresponding treatment plan.                Diagnosis 1:  R shoulder pain, ALLEGRA,  Chronic Cx spine DJD  Pain -  hot/cold therapy, manual therapy, splint/taping/bracing/orthotics, self management, education, directional preference exercise and home program  Decreased ROM/flexibility - manual therapy and therapeutic exercise  Decreased strength - therapeutic exercise and therapeutic activities  Inflammation - cold therapy and self management/home program  Impaired muscle performance - neuro re-education  Decreased function - therapeutic activities    Therapy Evaluation Codes:   1) History comprised of:   Personal factors that impact the plan of care:      None.    Comorbidity factors that impact the plan of care are:      Smoking.     Medications impacting care: None.  2) Examination of Body Systems  comprised of:   Body structures and functions that impact the plan of care:      Cervical spine and Shoulder.   Activity limitations that impact the plan of care are:      Bathing, Cooking and Dressing.  3) Clinical presentation characteristics are:   Stable/Uncomplicated.  4) Decision-Making    Low complexity using standardized patient assessment instrument and/or measureable assessment of functional outcome.  Cumulative Therapy Evaluation is: Low complexity.    Previous and current functional limitations:  (See Goal Flow Sheet for this information)    Short term and Long term goals: (See Goal Flow Sheet for this information)     Communication ability:  Patient appears to be able to clearly communicate and understand verbal and written communication and follow directions correctly.  Treatment Explanation - The following has been discussed with the patient:   RX ordered/plan of care  Anticipated outcomes  Possible risks and side effects  This patient would benefit from PT intervention to resume normal activities.   Rehab potential is good.    Frequency:  1 X week, once daily  Duration:  for 8 weeks  Discharge Plan:  Achieve all LTG.  Independent in home treatment program.  Reach maximal therapeutic benefit.    Please refer to the daily flowsheet for treatment today, total treatment time and time spent performing 1:1 timed codes.

## 2017-04-24 ENCOUNTER — THERAPY VISIT (OUTPATIENT)
Dept: PHYSICAL THERAPY | Facility: CLINIC | Age: 57
End: 2017-04-24
Payer: COMMERCIAL

## 2017-04-24 DIAGNOSIS — M47.22 OSTEOARTHRITIS OF SPINE WITH RADICULOPATHY, CERVICAL REGION: ICD-10-CM

## 2017-04-24 DIAGNOSIS — M25.511 PAIN IN JOINT OF RIGHT SHOULDER: ICD-10-CM

## 2017-04-24 PROCEDURE — 97140 MANUAL THERAPY 1/> REGIONS: CPT | Mod: GP | Performed by: PHYSICAL THERAPIST

## 2017-04-24 PROCEDURE — 97110 THERAPEUTIC EXERCISES: CPT | Mod: GP | Performed by: PHYSICAL THERAPIST

## 2017-05-01 ENCOUNTER — THERAPY VISIT (OUTPATIENT)
Dept: PHYSICAL THERAPY | Facility: CLINIC | Age: 57
End: 2017-05-01
Payer: COMMERCIAL

## 2017-05-01 DIAGNOSIS — M47.22 OSTEOARTHRITIS OF SPINE WITH RADICULOPATHY, CERVICAL REGION: ICD-10-CM

## 2017-05-01 DIAGNOSIS — M25.511 PAIN IN JOINT OF RIGHT SHOULDER: ICD-10-CM

## 2017-05-01 PROCEDURE — 97110 THERAPEUTIC EXERCISES: CPT | Mod: GP | Performed by: PHYSICAL THERAPIST

## 2017-05-02 ENCOUNTER — OFFICE VISIT (OUTPATIENT)
Dept: ORTHOPEDICS | Facility: CLINIC | Age: 57
End: 2017-05-02
Payer: COMMERCIAL

## 2017-05-02 VITALS — BODY MASS INDEX: 22.22 KG/M2 | WEIGHT: 150 LBS | HEIGHT: 69 IN

## 2017-05-02 DIAGNOSIS — M25.511 CHRONIC RIGHT SHOULDER PAIN: Primary | ICD-10-CM

## 2017-05-02 DIAGNOSIS — M25.811 SHOULDER IMPINGEMENT, RIGHT: ICD-10-CM

## 2017-05-02 DIAGNOSIS — M50.30 DEGENERATIVE CERVICAL DISC: ICD-10-CM

## 2017-05-02 DIAGNOSIS — M75.101 ROTATOR CUFF SYNDROME, RIGHT: ICD-10-CM

## 2017-05-02 DIAGNOSIS — G89.29 CHRONIC RIGHT SHOULDER PAIN: Primary | ICD-10-CM

## 2017-05-02 PROCEDURE — 99213 OFFICE O/P EST LOW 20 MIN: CPT | Mod: 25 | Performed by: FAMILY MEDICINE

## 2017-05-02 NOTE — PATIENT INSTRUCTIONS
Thank you for allowing us to participate in your care today.  Please find below your visit diagnosis and the plan going forward.    1. Chronic right shoulder pain    2. Degenerative cervical disc    3. Shoulder impingement, right    4. Rotator cuff syndrome, right      Discussed your progress and continued pain  Steroid injection of the right shoulder: subacromial space was performed today in clinic. This will help us determine if shoulder v neck  - Ok to shower  - No bathtub, hot tub or swimming for 2 days  - The lidocaine (what is giving you pain relief right now) will likely stop working in 1-2 hours.  You will then have pain again, similar to before you received the injection. The corticosteroid will not start working until approximately 3-5 days from now.  - Ice today and only do your normal amounts of activity    Follow up after an additional 4 PT sessions if not improving or sooner if needed. Call direct clinic number [[phone]] at any time with questions or concerns.    Mykel De La Rosa DO CAQSM  [facility]  Website: [email or web address]  Twitter: [email or web address]

## 2017-05-02 NOTE — MR AVS SNAPSHOT
After Visit Summary   5/2/2017    Hermelindo Conley    MRN: [id number]           Patient Information     Date Of Birth          1960        Visit Information        Provider Department      5/2/2017 6:20 PM Mykel De La Rosa DO [facility]        Today's Diagnoses     Chronic right shoulder pain    -  1    Degenerative cervical disc        Shoulder impingement, right        Rotator cuff syndrome, right          Care Instructions    Thank you for allowing us to participate in your care today.  Please find below your visit diagnosis and the plan going forward.    1. Chronic right shoulder pain    2. Degenerative cervical disc    3. Shoulder impingement, right    4. Rotator cuff syndrome, right      Discussed your progress and continued pain  Steroid injection of the right shoulder: subacromial space was performed today in clinic. This will help us determine if shoulder v neck  - Ok to shower  - No bathtub, hot tub or swimming for 2 days  - The lidocaine (what is giving you pain relief right now) will likely stop working in 1-2 hours.  You will then have pain again, similar to before you received the injection. The corticosteroid will not start working until approximately 3-5 days from now.  - Ice today and only do your normal amounts of activity    Follow up after an additional 4 PT sessions if not improving or sooner if needed. Call direct clinic number [[phone]] at any time with questions or concerns.    Mykel De La Rosa DO [facility]  Website: [email or web address]  Twitter: [email or web address]          Follow-ups after your visit        Your next 10 appointments already scheduled     May 08, 2017  9:20 AM CDT   [facility] Paul Breyen, PT   [facility] ([facility]  )    [address] [address] [address]   [phone]              Who to contact     If you have questions or need follow up information about  "today's clinic visit or your schedule please contact [facility] directly at [phone].  Normal or non-critical lab and imaging results will be communicated to you by [employer], letter or phone within 4 business days after the clinic has received the results. If you do not hear from us within 7 days, please contact the clinic through [employer] or phone. If you have a critical or abnormal lab result, we will notify you by phone as soon as possible.  Submit refill requests through [employer] or call your pharmacy and they will forward the refill request to us. Please allow 3 business days for your refill to be completed.          Additional Information About Your Visit        [employer] Information     [employer] lets you send messages to your doctor, view your test results, renew your prescriptions, schedule appointments and more. To sign up, go to [email or web address] . Click on \"Log in\" on the left side of the screen, which will take you to the Welcome page. Then click on \"Sign up Now\" on the right side of the page.     You will be asked to enter the access code listed below, as well as some personal information. Please follow the directions to create your username and password.     Your access code is: [email or web address]  Expires: 2017 11:20 AM     Your access code will  in 90 days. If you need help or a new code, please call your [place] clinic or [phone].        Care EveryWhere ID     This is your Care EveryWhere ID. This could be used by other organizations to access your [place] medical records  [id number]        Your Vitals Were     Height BMI (Body Mass Index)                5' 9\" (1.753 m) 22.15 kg/m2           Blood Pressure from Last 3 Encounters:   17 124/74   17 122/76   17 122/72    Weight from Last 3 Encounters:   17 150 lb (68 kg)   17 150 lb (68 kg)   17 150 lb (68 kg)              Today, you had the following     No orders found for " display       Primary Care Provider Office Phone # Fax #    Aditi Coronel [phone]       [facility] [address] [place] [address]        Thank you!     Thank you for choosing [facility]  for your care. Our goal is always to provide you with excellent care. Hearing back from our patients is one way we can continue to improve our services. Please take a few minutes to complete the written survey that you may receive in the mail after your visit with us. Thank you!             Your Updated Medication List - Protect others around you: Learn how to safely use, store and throw away your medicines at www.disposemymeds.org.          This list is accurate as of: 5/2/17  6:58 PM.  Always use your most recent med list.                   Brand Name Dispense Instructions for use    traZODone 50 MG tablet    DESYREL    60 tablet    Take 1-2 tablets ( mg) by mouth nightly as needed for sleep       * varenicline 0.5 MG X 11 & 1 MG X 42 tablet    CHANTIX STARTING MONTH MYNOR    53 tablet    Take 0.5 mg tab daily for 3 days, then 0.5 mg tab twice daily for 4 days, then 1 mg twice daily.       * varenicline 1 MG tablet    CHANTIX    56 tablet    Take 1 tablet (1 mg) by mouth 2 times daily       * Notice:  This list has 2 medication(s) that are the same as other medications prescribed for you. Read the directions carefully, and ask your doctor or other care provider to review them with you.

## 2017-05-02 NOTE — PROGRESS NOTES
"ASSESSMENT & PLAN    ICD-10-CM    1. Chronic right shoulder pain M25.511     G89.29    2. Degenerative cervical disc M50.30    Initially presented with chronic right shoulder pain without known injury. Question primary shoulder pathology v cervicogenic origin  MRI shoulder negative for tear/bursitis  Initiated PT for neck/shouder  Discussed your progress and continued pain  Steroid injection of the right subacromial bursa was done    Follow up after an additional 4 PT sessions if not improving or sooner if needed. Call direct clinic number [[phone]] at any time with questions or concerns. Instructed to call the office if the condition evolves or worsens.    -----    SUBJECTIVE:  Hermelindo Conley is a 57 year old male who is seen in follow-up for dhronic right shoulder pain and degenerative cervical disc. They were last seen 4/13/2017.     Since their last visit reports worsening pain. Has been working with PT addressing shoulder. Got minimal relief / provocation in symptoms were cervical PT.  At last PT visit was unable to address any exercises 2/2 increased pain/signs of impingement.     Patient's past medical, surgical, social, and family histories were reviewed today and no changes are noted.    REVIEW OF SYSTEMS:  Constitutional: NEGATIVE for fever, chills, change in weight  Skin: NEGATIVE for worrisome rashes, moles or lesions  GI/: NEGATIVE for bowel or bladder changes  Neuro: NEGATIVE for weakness, dizziness or paresthesias    OBJECTIVE:  Ht 5' 9\" (1.753 m)  Wt 150 lb (68 kg)  BMI 22.15 kg/m2   General: healthy, alert and in no distress  HEENT: no scleral icterus or conjunctival erythema  Skin: no suspicious lesions or rash. No jaundice.  CV: regular rhythm by palpation, no pedal edema  Resp: normal respiratory effort without conversational dyspnea   Psych: normal mood and affect  Gait: normal steady gait with appropriate coordination and balance  Neuro: normal light touch sensory exam of the " extremities.  Motor strength as noted below  MSK:  Inspection; holding arm guarded against chest  Palpation: TTP over supraspinatus insertion  Range of Motion: resists all active range of motion  Special tests; able to generate passive flexion/abduction > 90 degrees and extension > 45 degrees  + hawkings    Independent visualization of the below image:  None indicated at this time    Right Subacromial Bursa Corticosteroid Injection    Benefits, risks, and alternatives of the procedure were discussed with the patient, including bleeding, infection, hyperglycemia, localized lipodystrophy and hypopigmentation.  Patient elected to proceed and informed consent was signed.    Area was prepped with ChloraPrep and Ethyl Chloride was used for topical anesthetic purposes.  Using standard precautions, a 25-gauge 1.5 inch needle was used to inject 4 ml of 1% Lidocaine without Epinephrine and 1 ml of Depo Medrol (40 mg/ml) into the right subacromial space via a posterolateral approach.  Patient tolerated the procedure well and there were no complications.     Pain noted to be a 10/10 before completion of the procedure and 4/10 after completion of the procedure.    Patient's conditions were thoroughly discussed during today's visit with greater than 50% of the visit spent counseling the patient with total time spent face-to-face with the patient being 20 minutes with injection taking 5 minutes.    Mykel De La Rosa,  [facility]

## 2017-05-08 ENCOUNTER — THERAPY VISIT (OUTPATIENT)
Dept: PHYSICAL THERAPY | Facility: CLINIC | Age: 57
End: 2017-05-08
Payer: COMMERCIAL

## 2017-05-08 DIAGNOSIS — M47.22 OSTEOARTHRITIS OF SPINE WITH RADICULOPATHY, CERVICAL REGION: ICD-10-CM

## 2017-05-08 DIAGNOSIS — M25.511 PAIN IN JOINT OF RIGHT SHOULDER: ICD-10-CM

## 2017-05-08 PROCEDURE — 97110 THERAPEUTIC EXERCISES: CPT | Mod: GP | Performed by: PHYSICAL THERAPIST

## 2017-05-15 ENCOUNTER — THERAPY VISIT (OUTPATIENT)
Dept: PHYSICAL THERAPY | Facility: CLINIC | Age: 57
End: 2017-05-15
Payer: COMMERCIAL

## 2017-05-15 DIAGNOSIS — M47.22 OSTEOARTHRITIS OF SPINE WITH RADICULOPATHY, CERVICAL REGION: ICD-10-CM

## 2017-05-15 DIAGNOSIS — M25.511 PAIN IN JOINT OF RIGHT SHOULDER: ICD-10-CM

## 2017-05-15 PROCEDURE — 97110 THERAPEUTIC EXERCISES: CPT | Mod: GP | Performed by: PHYSICAL THERAPIST

## 2017-05-17 ENCOUNTER — CARE COORDINATION (OUTPATIENT)
Dept: ORTHOPEDICS | Facility: CLINIC | Age: 57
End: 2017-05-17

## 2017-05-17 DIAGNOSIS — M54.12 CERVICAL RADICULOPATHY: Primary | ICD-10-CM

## 2017-05-17 RX ORDER — PREDNISONE 20 MG/1
40 TABLET ORAL DAILY
Qty: 10 TABLET | Refills: 0 | Status: SHIPPED | OUTPATIENT
Start: 2017-05-17 | End: 2017-05-22

## 2017-05-17 NOTE — PROGRESS NOTES
Notified by PT that Hermelindo has not made any progress in his rehab. Was unable to do much at last visit.    Called Hermelindo to discuss progress:  Using lawnmower and chain saw over the weekend - which worsened his symptoms. Now having numbness into the hands and pain is centered over the top portion of the right shoulder.    Plan  1) Rx for prednisone sent to [facility], [place]  2) MRI cervical spine. Previous CT showed degenerative changes mostly at C6-7.    Mykel De La Rosa DO, CAM  [place] [facility]

## 2017-05-24 ENCOUNTER — HOSPITAL ENCOUNTER (OUTPATIENT)
Dept: MRI IMAGING | Facility: CLINIC | Age: 57
Discharge: HOME | End: 2017-05-24
Attending: FAMILY MEDICINE | Admitting: FAMILY MEDICINE
Payer: COMMERCIAL

## 2017-05-24 ENCOUNTER — TELEPHONE (OUTPATIENT)
Dept: ORTHOPEDICS | Facility: CLINIC | Age: 57
End: 2017-05-24

## 2017-05-24 DIAGNOSIS — M54.12 CERVICAL RADICULOPATHY: ICD-10-CM

## 2017-05-24 PROCEDURE — 72141 MRI NECK SPINE W/O DYE: CPT

## 2017-05-30 ENCOUNTER — TELEPHONE (OUTPATIENT)
Dept: PALLIATIVE MEDICINE | Facility: CLINIC | Age: 57
End: 2017-05-30

## 2017-05-30 ENCOUNTER — OFFICE VISIT (OUTPATIENT)
Dept: ORTHOPEDICS | Facility: CLINIC | Age: 57
End: 2017-05-30
Payer: COMMERCIAL

## 2017-05-30 VITALS
BODY MASS INDEX: 22.22 KG/M2 | DIASTOLIC BLOOD PRESSURE: 80 MMHG | SYSTOLIC BLOOD PRESSURE: 120 MMHG | HEIGHT: 69 IN | WEIGHT: 150 LBS

## 2017-05-30 DIAGNOSIS — M50.20 HERNIATED CERVICAL INTERVERTEBRAL DISC: ICD-10-CM

## 2017-05-30 DIAGNOSIS — M47.812 FACET ARTHROPATHY, CERVICAL: ICD-10-CM

## 2017-05-30 DIAGNOSIS — M54.12 CERVICAL RADICULOPATHY: Primary | ICD-10-CM

## 2017-05-30 DIAGNOSIS — M48.02 SPINAL STENOSIS IN CERVICAL REGION: ICD-10-CM

## 2017-05-30 PROCEDURE — 99214 OFFICE O/P EST MOD 30 MIN: CPT | Performed by: FAMILY MEDICINE

## 2017-05-30 NOTE — PROGRESS NOTES
"ASSESSMENT & PLAN    ICD-10-CM    1. Cervical radiculopathy M54.12 [facility]     [facility]   2. Herniated cervical intervertebral disc M50.20 [facility]     [facility]   3. Facet arthropathy, cervical (H) M12.88 [facility]     [facility]   4. 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 Call direct clinic number [[phone]] at any time with questions or concerns. Instructed to call the office if the condition evolves or worsens.    -----    SUBJECTIVE:  Hermelindo Conley is a 57 year old male who is seen in follow-up for cervical radiculopathy/right shoulder pain. They were last seen 5/2/2017.     Since their last visit reports worsening pain. They indicate that their pain level is 5/10. The cortisone injection to the right shoulder performed at his last visit gave him about 3-4 days relief. He did not seem to get much relief from the medrol prescription.    Patient's past medical, surgical, social, and family histories were reviewed today and no changes are noted.    REVIEW OF SYSTEMS:  Constitutional: NEGATIVE for fever, chills, change in weight  Skin: NEGATIVE for worrisome rashes, moles or lesions  GI/: NEGATIVE for bowel or bladder changes  Neuro: NEGATIVE for weakness, dizziness or paresthesias    OBJECTIVE:  /80  Ht 5' 9\" (1.753 m)  Wt 150 lb (68 kg)  BMI 22.15 kg/m2   General: healthy, alert and in no distress  HEENT: no scleral icterus or " conjunctival erythema  Skin: no suspicious lesions or rash. No jaundice.  CV: regular rhythm by palpation  Resp: normal respiratory effort without conversational dyspnea   Psych: normal mood and affect  Gait: normal steady gait with appropriate coordination and balance  MSK:  Deferred    Independent visualization of the below image:  MRI OF THE CERVICAL SPINE WITHOUT CONTRAST May 24, 2017 7:53 AM     HISTORY: Right shoulder/neck pain with radiculopathy, not improving  with conservative measures. Radiculopathy, cervical region.     TECHNIQUE: Multiplanar, multisequence MRI images of the cervical spine  were acquired without intravenous contrast.     FINDINGS: There is normal alignment of the cervical vertebrae.  Vertebral body heights of the cervical spine are normal. Marrow signal  throughout the cervical vertebrae is within normal limits. There is no  evidence for fracture or pathologic bony lesion of the cervical spine.        There is loss of disc height, disc desiccation and posterior disc  bulging to varying degrees at all levels of the cervical spine.       The cervical spinal cord is normal in contour. There is no abnormal  signal in the cervical spinal cord. There is no evidence for  intrathecal abnormality.     Level by level:     C2-C3: There is facet arthropathy bilaterally, uncinate hypertrophy  bilaterally and a posterior broad-based disc-osteophyte complex. These  findings result in mild left neural foraminal narrowing but no spinal  canal or right foraminal stenosis.     C3-C4: There is facet arthropathy bilaterally, uncinate hypertrophy  bilaterally and a posterior broad-based disc-osteophyte complex with a  superimposed tiny posterior central disc herniation (protrusion).  These findings result in moderate left foraminal stenosis and mild  right foraminal narrowing but no spinal canal stenosis.     C4-C5: There is facet arthropathy bilaterally, uncinate hypertrophy  bilaterally and a posterior  broad-based disc-osteophyte complex with a  superimposed tiny posterior central disc herniation (protrusion).  These findings result in mild bilateral neural foraminal narrowing and  minimal spinal canal narrowing.     C5-C6: There is facet arthropathy bilaterally, uncinate hypertrophy  bilaterally and a posterior broad-based disc-osteophyte complex with a  superimposed small irregularly-shaped posterior broad-based disc  herniation (protrusion). These findings result in mild-moderate left  foraminal narrowing, moderate right foraminal stenosis and mild spinal  canal narrowing.     C6-C7: There is facet arthropathy bilaterally, uncinate hypertrophy  bilaterally and a posterior broad-based disc-osteophyte complex. These  findings result in mild-moderate left foraminal narrowing and mild  right foraminal narrowing as well as minimal spinal canal narrowing.     C7-T1: There is facet arthropathy bilaterally, uncinate hypertrophy  bilaterally and a posterior broad-based disc-osteophyte complex. There  is no spinal canal or neural foraminal stenosis at this level.     IMPRESSION: Diffuse degenerative changes of the cervical spine as  described above.     DANGELO JAMESON MD    Patient's conditions were thoroughly discussed during today's visit with greater than 50% of the visit spent counseling the patient with total time spent face-to-face with the patient being 20 minutes.    Mykel De La Rosa,  [facility]

## 2017-05-30 NOTE — MR AVS SNAPSHOT
After Visit Summary   5/30/2017    Hermelindo Conley    MRN: [id number]           Patient Information     Date Of Birth          1960        Visit Information        Provider Department      5/30/2017 10:40 AM Mykel De La Rosa DO [facility]        Today's Diagnoses     Cervical radiculopathy    -  1    Herniated cervical intervertebral disc        Facet arthropathy, cervical (H)        Spinal stenosis in cervical region          Care Instructions    Thank you for allowing us to participate in your care today.  Please find below your visit diagnosis and the plan going forward.    1. Cervical radiculopathy    2. Herniated cervical intervertebral disc    3. Facet arthropathy, cervical (H)    4. 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 Call direct clinic number [[phone]] at any time with questions or concerns.    Mykel De La Rosa DO [facility]  Website: [email or web address]  Twitter: [email or web address]            Follow-ups after your visit        Additional Services     PAIN MANAGEMENT CENTER ([place]) REFERRAL       [facility] Referral    Please be aware that coverage of these services is subject to the terms and limitations of your health insurance plan.  Call member services at your health plan with any benefit or coverage questions.      Please bring the following to your appointment:  Any x-rays, CTs or MRIs which have been performed.  Contact the facility where they were done to arrange for  prior to your scheduled appointment.  Any new CT, MRI or other procedures ordered by your specialist must be performed at a [facility] or  coordinated by your clinic's referral office.    List of current medications   This referral request  Any documents/labs given to you for this referral      Reason for Consult:  Procedure or injection only - patient will be contacted within 24 hrs to schedule: Epidural Steroid (interlaminar approach): Cervical C5-6.      Please answer the following questions:    What is your diagnosis for this patient's pain?  Cervical herniation with right shoulder/cuff radic, central and foraminal stenosis    Do you have any specific questions for the pain specialist? No    Are there any red flags that may impact the assessment or management of this patient?    None    ANY DIAGNOSTIC TESTS THAT ARE NOT IN EPIC SHOULD BE SENT TO THE PAIN CENTER    Please note the pre op pain consult must be scheduled 2-3 weeks prior to the patient's surgery. Patient's trying to schedule within 2 weeks of surgery may not be accommodated.    Pre Op Pain Consults are only good for 30 days.    REGARDING OPIOID MEDICATIONS:  We will always address appropriateness of opioid pain medications, but we generally will not automatically take on a prescribing role. When we do take on prescribing of opioids for chronic pain, it is in collaboration with the referring physician for an intermediate period of time (months), with an expectation that the primary physician or provider will assume the prescribing role if medications are effective at stable doses with demonstrated compliance.  Therefore, please do not assume that your prescribing responsibilities end on the day of pain clinic consultation.  Is this agreeable to you? YES    For any questions, contact the [facility] at [phone]            PAIN MANAGEMENT CENTER ([place]) REFERRAL       Your provider has referred you to the [facility].    Reason for Referral: Comprehensive Evaluation and Management    Please complete the following questions:    What is your  diagnosis for the patient's pain? Cervical herniation with right shoulder radic, spinal and foraminal stenosis    Do you have any specific questions for the pain specialist? No    Are there any red flags that may impact the assessment or management of the patient? None    **ANY DIAGNOSTIC TESTS THAT ARE NOT IN EPIC SHOULD BE SENT TO THE PAIN CENTER**    Please note the Pre-Op Pain Consult must be scheduled 2-3 weeks prior to the patient's surgery.  Patient's trying to schedule within 2 weeks of surgery may not be accommodated.     Pre-Op Pain Consults are only good for 30 days.    REGARDING OPIOID MEDICATIONS:  We will always address appropriateness of opioid pain medications, but we generally will not automatically take on a prescribing role. When we do take on prescribing of opioids for chronic pain, it is in collaboration with the referring physician for an intermediate period of time (months), with an expectation that the primary physician or provider will assume the prescribing role if medications are effective at stable doses with demonstrated compliance.  Therefore, please do not assume that your prescribing responsibilities end on the day of pain clinic consultation.  Is this agreeable to you? YES    For any questions, contact the [facility] at [phone].    Please be aware that coverage of these services is subject to the terms and limitations of your health insurance plan.  Call member services at your health plan with any benefit or coverage questions.      Please bring the following with you to your appointment:    (1) Any X-Rays, CTs or MRIs which have been performed.  Contact the facility where they were done to arrange for  prior to your scheduled appointment.    (2) List of current medications   (3) This referral request   (4) Any documents/labs given to you for this referral                  Who to contact     If you have questions or need follow up information about  "today's clinic visit or your schedule please contact [facility] directly at [phone].  Normal or non-critical lab and imaging results will be communicated to you by [email or web address], letter or phone within 4 business days after the clinic has received the results. If you do not hear from us within 7 days, please contact the clinic through [email or web address] or phone. If you have a critical or abnormal lab result, we will notify you by phone as soon as possible.  Submit refill requests through [facility] or call your pharmacy and they will forward the refill request to us. Please allow 3 business days for your refill to be completed.          Additional Information About Your Visit        [email or web address] Information     GTI lets you send messages to your doctor, view your test results, renew your prescriptions, schedule appointments and more. To sign up, go to [email or web address] . Click on \"Log in\" on the left side of the screen, which will take you to the Welcome page. Then click on \"Sign up Now\" on the right side of the page.     You will be asked to enter the access code listed below, as well as some personal information. Please follow the directions to create your username and password.     Your access code is: [email or web address]  Expires: 2017 11:20 AM     Your access code will  in 90 days. If you need help or a new code, please call your [facility] clinic or [phone].        Care EveryWhere ID     This is your Care EveryWhere ID. This could be used by other organizations to access your [facility] medical records  [id number]        Your Vitals Were     Height BMI (Body Mass Index)                5' 9\" (1.753 m) 22.15 kg/m2           Blood Pressure from Last 3 Encounters:   17 120/80   17 124/74   17 122/76    Weight from Last 3 Encounters:   17 150 lb (68 kg)   17 150 lb (68 kg)   17 150 lb (68 kg)              We Performed the Following     PAIN MANAGEMENT CENTER " ([place]) REFERRAL     PAIN MANAGEMENT CENTER ([place]) REFERRAL        Primary Care Provider Office Phone # Fax #    Aditi Coronel [phone]       [place] [address] [place] [address]        Thank you!     Thank you for choosing [facility]  for your care. Our goal is always to provide you with excellent care. Hearing back from our patients is one way we can continue to improve our services. Please take a few minutes to complete the written survey that you may receive in the mail after your visit with us. Thank you!             Your Updated Medication List - Protect others around you: Learn how to safely use, store and throw away your medicines at www.disposemymeds.org.          This list is accurate as of: 5/30/17 10:57 AM.  Always use your most recent med list.                   Brand Name Dispense Instructions for use    traZODone 50 MG tablet    DESYREL    60 tablet    Take 1-2 tablets ( mg) by mouth nightly as needed for sleep       * varenicline 0.5 MG X 11 & 1 MG X 42 tablet    CHANTIX STARTING MONTH MYNOR    53 tablet    Take 0.5 mg tab daily for 3 days, then 0.5 mg tab twice daily for 4 days, then 1 mg twice daily.       * varenicline 1 MG tablet    CHANTIX    56 tablet    Take 1 tablet (1 mg) by mouth 2 times daily       * Notice:  This list has 2 medication(s) that are the same as other medications prescribed for you. Read the directions carefully, and ask your doctor or other care provider to review them with you.

## 2017-05-30 NOTE — NURSING NOTE
"Chief Complaint   Patient presents with     RECHECK       Initial /80  Ht 5' 9\" (1.753 m)  Wt 150 lb (68 kg)  BMI 22.15 kg/m2 Estimated body mass index is 22.15 kg/(m^2) as calculated from the following:    Height as of this encounter: 5' 9\" (1.753 m).    Weight as of this encounter: 150 lb (68 kg).  Medication Reconciliation: complete     Jose Brooks, ATC    "

## 2017-05-31 NOTE — TELEPHONE ENCOUNTER
Pre-screening Questions for Radiology Injections:    Injection to be done at which interventional clinic site? [facility]    Procedure ordered by Rio Medina     Procedure ordered? Cervical Epidural Steroid Injection    What insurance would patient like us to bill for this procedure? [employer]      Worker's comp-Any injection DO NOT SCHEDULE and route to Kaitlyn Golden.      [employer] insurance - For SI joint injections, DO NOT SCHEDULE and route Kaitlyn Golden.      [employer]- MBB's must be scheduled at LEAST two weeks apart      [employer] - Any injection besides hip/shoulder/knee joint DO NOT SCHEDULE and route to Kaitlyn Golden. She will obtain PA and call pt back to schedule procedure or notify pt of denial.     HP [employer]-PA REQUIRED FOR NON-BENITA OR Joint injections    Any chance of pregnancy? Not Applicable   If YES, do NOT schedule and route to RN pool    Is an  needed? No     Patient has a drive home? (mandatory) YES:     Is patient taking any blood thinners (plavix, coumadin, jantoven, warfarin, heparin, pradaxa or dabigatran )? No   If hold needed, do NOT schedule, route to RN pool     Is patient taking any aspirin products? No     If more than 325mg/day do NOT schedule; route to RN pool     For CERVICAL procedures, hold all aspirin products for 6 days.      Does the patient have a bleeding or clotting disorder? No     If yes, okay to schedule AND route to RN nurse pool    **For any patients with platelet count <100, must be forwarded to provider**    Is patient diabetic?  No  If YES, have them bring their glucometer.    Does patient have an active infection or treated for one within the past week? No     Is patient currently taking any antibiotics?  No     For patients on chronic, preventative, or prophylactic antibiotics, procedures may be scheduled.     For patients on antibiotics for active or recent infection:    Landy Leavitt [place], [place]-antibiotic course must have  been completed for 4 days    Babita Parker-antibiotic course must have been completed for 7 days    Is patient currently taking any steroid medications? (i.e. Prednisone, Medrol)  No     For patients on steroid medications:    Landy Leavitt [place], [place]-steroid course must have been completed for 4 days    Babita Parker-steroid course must have been completed for 7 days    Reviewed with patient:  If you are started on any steroids or antibiotics between now and your appointment, you must contact us because it may affect our ability to perform your procedure.  Yes    Is patient actively being treated for cancer or immunocompromised, including the spleen having been removed? No    If YES, do NOT schedule and route to RN pool     **For Dr. Montes patients without spleens should have the chart sent to her**    Are you able to get on and off an exam table with minimal or no assistance? Yes  If NO, do NOT schedule and route to RN pool    Are you able to roll over and lay on your stomach with minimal or no assistance? Yes  If NO, do NOT schedule and route to RN pool     Any allergies to contrast dye, iodine, shellfish, or numbing and steroid medications? No  If YES, route to RN pool AND add allergy information to appointment notes    Allergies: Review of patient's allergies indicates no known allergies.        Has the patient had a flu shot or any other vaccinations within 7 days before or after the procedure.  No       Does patient have an MRI/CT?  YES: MRI  (SI joint, hip injections, lumbar sympathetic blocks, and stellate ganglion blocks do not require an MRI)    Was the MRI done w/in the last 3 years?  Yes    Was MRI done at [place]? Yes      If not, where was it done? N/A       If MRI was not done at [place], [facility] or [facility] do NOT schedule and route to nursing.  If pt has an imaging disc, the injection may be scheduled but pt has to bring disc to appt. If they show up w/out disc  the injection cannot be done    Reminders (please tell patient if applicable):       Instructed pt to arrive 30 minutes early for IV start if this is for a cervical procedure, ALL sympathetic (stellate ganglion, hypogastric, or lumbar sympathetic block) and all sedation procedures (RFA, spinal cord stimulation trials).  Not Applicable    -IVs are not routinely placed for Bill and [place] cervical case       If NPO for sedation, informed patient that it is okay to take medications with sips of water (except if they are to hold blood thinners).  Not Applicable   *DO take blood pressure medication if it is prescribed*      If this is for a cervical BENITA, informed patient that aspirin needs to be held for 6 days.   YES: Informed      For all patients not having spinal cord stimulator (SCS) trials or radiofrequency ablations (RFAs), informed patient:  IV sedation is not provided for this procedure.  If you feel that an oral anti-anxiety medication is needed, you can discuss this further with your referring provider or primary care provider.  The Pain Clinic provider will discuss specifics of what the procedure includes at your appointment.  Most procedures last 10-20 minutes.  We use numbing medications to help with any discomfort during the procedure.  Not Applicable      Do not schedule procedures requiring IV placement in the first appointment of the day or first appointment after lunch.       For patients 85 or older we recommend having an adult stay w/ them for the remainder of the day.       Does the patient have any questions?  NO  Kathy Martinez  [facility]

## 2017-06-06 ENCOUNTER — RADIOLOGY INJECTION OFFICE VISIT (OUTPATIENT)
Dept: PALLIATIVE MEDICINE | Facility: CLINIC | Age: 57
End: 2017-06-06
Payer: COMMERCIAL

## 2017-06-06 ENCOUNTER — RADIANT APPOINTMENT (OUTPATIENT)
Dept: GENERAL RADIOLOGY | Facility: CLINIC | Age: 57
End: 2017-06-06
Attending: PHYSICAL MEDICINE & REHABILITATION

## 2017-06-06 VITALS — OXYGEN SATURATION: 97 % | DIASTOLIC BLOOD PRESSURE: 82 MMHG | SYSTOLIC BLOOD PRESSURE: 103 MMHG | HEART RATE: 75 BPM

## 2017-06-06 DIAGNOSIS — M47.812 CERVICAL SPONDYLOSIS WITHOUT MYELOPATHY: ICD-10-CM

## 2017-06-06 DIAGNOSIS — M54.12 CERVICAL RADICULOPATHY: Primary | ICD-10-CM

## 2017-06-06 DIAGNOSIS — M54.12 CERVICAL RADICULOPATHY: ICD-10-CM

## 2017-06-06 PROCEDURE — 62321 NJX INTERLAMINAR CRV/THRC: CPT | Performed by: PHYSICAL MEDICINE & REHABILITATION

## 2017-06-06 ASSESSMENT — PAIN SCALES - GENERAL: PAINLEVEL: EXTREME PAIN (8)

## 2017-06-06 NOTE — PATIENT INSTRUCTIONS
[facility] Procedure Discharge Instructions    Today you saw:    Dr. Pia Bill     Your procedure:  Epidural steroid injection     Medications used:  Lidocaine (anesthetic)Dexamethasone (steroid), Normal Saline, Omnipaque (contrast)             Be cautious when walking as numbness and/or weakness in the legs may occur up to 6-8 hours after the procedure due to effect of the local anesthetic    Do not drive for 6 hours. The effect of the local anesthetic could slow your reflexes.     Avoid strenuous activity for the first 24 hours. You may resume your regular activities after that.     You may shower, however avoid swimming, tub baths or hot tubs for 24 hours following your procedure    If you were fasting, you can resume your regular diet and medications    You may have a mild to moderate increase in pain for several days following the injection.      You may use ice packs for 10-15 minutes, 3 to 4 times a day at the injection site for comfort    Do not use heat to painful areas for 6 to 8 hours. This will give the local anesthetic time to wear off and prevent you from accidentally burning your skin.    You may use anti-inflammatory medications (such as Ibuprofen/Advil or Aleve) or Tylenol for pain control if necessary    If you have diabetes, check your blood sugar more frequently than usual as your blood sugar may be higher than normal for 10-14 days following a steroid injection. Contact your doctor who manages your diabetes if your blood sugar is higher than usual    It may take up to 14 days for the steroid medication to start working although you may feel the effect as early as a few days after the procedure.       If you experience any of the following, call the pain center nursing line during work hours at [phone] or on-call physician after hours at [phone]:  -Fever over 100 degree F  -Swelling, bleeding, redness, drainage, warmth at the injection site  -Progressive weakness or numbness  in your legs or arms  -Loss of bowel or bladder function  -Unusual headache that is not relieved by Tylenol  -Unusual new onset of pain that is not improving    Phone #s:  Appointment scheduling line: [phone]

## 2017-06-06 NOTE — NURSING NOTE
Injection intake:    If this procedure is requiring IV sedation has patient been NPO for 6  Hours? NA    Is patient on coumadin, plavix or other prescribed blood thinner?   No    If patient is on coumadin was it held for 5 days?   NA    If patient is on plavix was it held for 7 days?    NA     Does patient take aspirin?  No    If this is for a cervical procedure and patient is on aspirin has it been held for 6 days?   NA    Any allergies to contrast dye, iodine, steroid and/or numbing medications?  NO    Is patient currently taking antibiotics or have an active infection?  NO    Does patient have a ? Yes       Is patient pregnant or breastfeeding?  Not Applicable    Are the vital signs normal?  Yes      Evelyn Luis [facility] [place]

## 2017-06-06 NOTE — NURSING NOTE
Discharge Information    IV Discontiued Time:  NA    Amount of Fluid Infused:  NA    Discharge Criteria = When patient returns to baseline or as per MD order    Consciousness:  Pt is fully awake    Circulation:  BP +/- 20% of pre-procedure level    Respiration:  Patient is able to breathe deeply    O2 Sat:  Patient is able to maintain O2 Sat >92% on room air    Activity:  Moves 4 extremities on command    Ambulation:  Patient is able to stand and walk or stand and pivot into wheelchair    Dressing:  Clean/dry or No Dressing    Notes:   Discharge instructions and AVS given to patient    Patient meets criteria for discharge?  YES    Admitted to PCU?  No    Responsible adult present to accompany patient home?  Yes    Signature/Title:    Deysi Ladd  RN Care Coordinator  [facility] [place]

## 2017-06-06 NOTE — PROGRESS NOTES
[facility] - Procedure Note    Date of Visit: 6/6/2017    Procedure performed: C7-T1 interlaminar epidural steroid injection with fluoroscopic guidance  Diagnosis: Cervical spondylosis; Cervical radiculitis/radiculopathy  : Pia Bill DO  Anesthesia: none    Indications: Hermelindo Conley is a 57 year old male who is seen at the request of Dr. De La Rosa for cervical epidural steroid injection. The patient describes right shoulder pain. The patient has been exhibiting symptoms consistent with cervical intraspinal inflammation and radiculopathy. Symptoms have been persistent, disabling, and intermittently severe. The patient reports minimal improvement with conservative treatment, including physical therapy.    Cervical MRI was done on 5/24/17 which showed   FINDINGS: There is normal alignment of the cervical vertebrae.  Vertebral body heights of the cervical spine are normal. Marrow signal  throughout the cervical vertebrae is within normal limits. There is no  evidence for fracture or pathologic bony lesion of the cervical spine.        There is loss of disc height, disc desiccation and posterior disc  bulging to varying degrees at all levels of the cervical spine.       The cervical spinal cord is normal in contour. There is no abnormal  signal in the cervical spinal cord. There is no evidence for  intrathecal abnormality.     Level by level:     C2-C3: There is facet arthropathy bilaterally, uncinate hypertrophy  bilaterally and a posterior broad-based disc-osteophyte complex. These  findings result in mild left neural foraminal narrowing but no spinal  canal or right foraminal stenosis.     C3-C4: There is facet arthropathy bilaterally, uncinate hypertrophy  bilaterally and a posterior broad-based disc-osteophyte complex with a  superimposed tiny posterior central disc herniation (protrusion).  These findings result in moderate left foraminal stenosis and mild  right foraminal narrowing  but no spinal canal stenosis.     C4-C5: There is facet arthropathy bilaterally, uncinate hypertrophy  bilaterally and a posterior broad-based disc-osteophyte complex with a  superimposed tiny posterior central disc herniation (protrusion).  These findings result in mild bilateral neural foraminal narrowing and  minimal spinal canal narrowing.     C5-C6: There is facet arthropathy bilaterally, uncinate hypertrophy  bilaterally and a posterior broad-based disc-osteophyte complex with a  superimposed small irregularly-shaped posterior broad-based disc  herniation (protrusion). These findings result in mild-moderate left  foraminal narrowing, moderate right foraminal stenosis and mild spinal  canal narrowing.     C6-C7: There is facet arthropathy bilaterally, uncinate hypertrophy  bilaterally and a posterior broad-based disc-osteophyte complex. These  findings result in mild-moderate left foraminal narrowing and mild  right foraminal narrowing as well as minimal spinal canal narrowing.     C7-T1: There is facet arthropathy bilaterally, uncinate hypertrophy  bilaterally and a posterior broad-based disc-osteophyte complex. There  is no spinal canal or neural foraminal stenosis at this level.         IMPRESSION: Diffuse degenerative changes of the cervical spine as  described above.    Allergies:    No Known Allergies     Vitals:  /82  Pulse 75  SpO2 97%    Review of Systems: The patient denies recent fever, chills, illness, use of antibiotics or anticoagulants. All other 10-point review of systems negative.     Procedure: The procedure and risks were explained, and informed written consent was obtained from the patient. Risks include but are not limited to: infection, bleeding, increased pain, and damage to soft tissue, nerve, muscle, and vasculature structures. After getting informed consent, patient was brought into the procedure suite and was placed in a prone position on the procedure table. A Pause for the  Cause was performed. Patient was prepped and draped in sterile fashion.     The C7-T1 interspace was identified with use of fluoroscopy in AP view. A 25-gauge, 1.5 inch needle was used to anesthetize the skin and subcutaneous tissue entry site with a total of 2 ml of 1% lidocaine. Under fluoroscopic visualization, a 22-gauge, 3.5 inch Tuohy epidural needle was slowly advanced towards the epidural space a few millimeters right of midline. The latter part of the needle advancement was guided with fluoroscopy in the lateral view. The epidural space was identified using loss of resistance technique. After negative aspiration for heme and cerebrospinal fluid, a total of 1 mL of non-ionic contrast was injected to confirm needle placement. 9 mL of contrast was wasted. Epidurogram confirmed spread within the posterior epidural space. 2 ml of 40mg/ml of triamcinolone and 1 ml of preservative free saline was injected. The needle was removed.  Images were saved to PACS.    The patient tolerated the procedure well, and there was no evidence of procedural complications. No new sensory or motor deficits were noted following the procedure. The patient was stable and able to ambulate on discharge home. Post-procedure instructions were provided.     Pre-procedure pain score: 0/10 in the neck, 8/10 in the arm  Post-procedure pain score: 0/10 in the neck, 0/10 in the arm    Assessment/Plan: Hermelindo Conley is a 57 year old male s/p cervical interlaminar epidural steroid injection today for cervical spondylosis and radiculitis/radiculopathy.     1. Following today's procedure, the patient was advised to contact the [facility] for any of the following:   Fever, chills, or night sweats   New onset of pain, numbness, or weakness   Any questions/concerns regarding the procedure  If unable to contact the Pain Center, the patient was instructed to go to a local Emergency Room for any complications.   2. The patient will  receive a follow-up call in 1 week.   3. Follow-up with Dr. De La Rosa in 2 weeks for post-procedure evaluation.    Pia Bill DO  [facility]  [facility]

## 2017-06-06 NOTE — MR AVS SNAPSHOT
After Visit Summary   6/6/2017    Hermelindo Conley    MRN: [id number]           Patient Information     Date Of Birth          1960        Visit Information        Provider Department      6/6/2017 8:15 AM Pia Bill DO [facility]        Care Instructions    [facility] Procedure Discharge Instructions    Today you saw:    Dr. Pia Bill     Your procedure:  Epidural steroid injection     Medications used:  Lidocaine (anesthetic)Dexamethasone (steroid), Normal Saline, Omnipaque (contrast)             Be cautious when walking as numbness and/or weakness in the legs may occur up to 6-8 hours after the procedure due to effect of the local anesthetic    Do not drive for 6 hours. The effect of the local anesthetic could slow your reflexes.     Avoid strenuous activity for the first 24 hours. You may resume your regular activities after that.     You may shower, however avoid swimming, tub baths or hot tubs for 24 hours following your procedure    If you were fasting, you can resume your regular diet and medications    You may have a mild to moderate increase in pain for several days following the injection.      You may use ice packs for 10-15 minutes, 3 to 4 times a day at the injection site for comfort    Do not use heat to painful areas for 6 to 8 hours. This will give the local anesthetic time to wear off and prevent you from accidentally burning your skin.    You may use anti-inflammatory medications (such as Ibuprofen/Advil or Aleve) or Tylenol for pain control if necessary    If you have diabetes, check your blood sugar more frequently than usual as your blood sugar may be higher than normal for 10-14 days following a steroid injection. Contact your doctor who manages your diabetes if your blood sugar is higher than usual    It may take up to 14 days for the steroid medication to start working although you may feel the effect as early as a few days after the  "procedure.       If you experience any of the following, call the pain center nursing line during work hours at [phone] or on-call physician after hours at [phone]:  -Fever over 100 degree F  -Swelling, bleeding, redness, drainage, warmth at the injection site  -Progressive weakness or numbness in your legs or arms  -Loss of bowel or bladder function  -Unusual headache that is not relieved by Tylenol  -Unusual new onset of pain that is not improving    Phone #s:  Appointment scheduling line: [phone]            Follow-ups after your visit        Who to contact     If you have questions or need follow up information about today's clinic visit or your schedule please contact [facility] directly at [phone].  Normal or non-critical lab and imaging results will be communicated to you by [employer], letter or phone within 4 business days after the clinic has received the results. If you do not hear from us within 7 days, please contact the clinic through [employer] or phone. If you have a critical or abnormal lab result, we will notify you by phone as soon as possible.  Submit refill requests through [employer] or call your pharmacy and they will forward the refill request to us. Please allow 3 business days for your refill to be completed.          Additional Information About Your Visit        [employer] Information     [employer] lets you send messages to your doctor, view your test results, renew your prescriptions, schedule appointments and more. To sign up, go to [email or web address] . Click on \"Log in\" on the left side of the screen, which will take you to the Welcome page. Then click on \"Sign up Now\" on the right side of the page.     You will be asked to enter the access code listed below, as well as some personal information. Please follow the directions to create your username and password.     Your access code is: [email or web address]  Expires: 2017 11:20 AM     Your access code will  " in 90 days. If you need help or a new code, please call your [facility] clinic or [phone].        Care EveryWhere ID     This is your Care EveryWhere ID. This could be used by other organizations to access your [facility] medical records  [id number]        Your Vitals Were     Pulse Pulse Oximetry                74 98%           Blood Pressure from Last 3 Encounters:   06/06/17 110/74   05/30/17 120/80   04/13/17 124/74    Weight from Last 3 Encounters:   05/30/17 68 kg (150 lb)   05/02/17 68 kg (150 lb)   04/13/17 68 kg (150 lb)              Today, you had the following     No orders found for display       Primary Care Provider Office Phone # Fax #    Aditi Coronel [phone]       [facility] [address] [place] [address]        Thank you!     Thank you for choosing [facility]  for your care. Our goal is always to provide you with excellent care. Hearing back from our patients is one way we can continue to improve our services. Please take a few minutes to complete the written survey that you may receive in the mail after your visit with us. Thank you!             Your Updated Medication List - Protect others around you: Learn how to safely use, store and throw away your medicines at www.disposemymeds.org.          This list is accurate as of: 6/6/17  8:55 AM.  Always use your most recent med list.                   Brand Name Dispense Instructions for use    traZODone 50 MG tablet    DESYREL    60 tablet    Take 1-2 tablets ( mg) by mouth nightly as needed for sleep       * varenicline 0.5 MG X 11 & 1 MG X 42 tablet    CHANTIX STARTING MONTH MYNOR    53 tablet    Take 0.5 mg tab daily for 3 days, then 0.5 mg tab twice daily for 4 days, then 1 mg twice daily.       * varenicline 1 MG tablet    CHANTIX    56 tablet    Take 1 tablet (1 mg) by mouth 2 times daily       * Notice:  This list has 2 medication(s) that are the same as other  medications prescribed for you. Read the directions carefully, and ask your doctor or other care provider to review them with you.

## 2017-06-13 ENCOUNTER — TELEPHONE (OUTPATIENT)
Dept: PALLIATIVE MEDICINE | Facility: CLINIC | Age: 57
End: 2017-06-13

## 2017-06-13 NOTE — TELEPHONE ENCOUNTER
Patient had a CLEMENTE injection on 06/06/2017.  Called patient for an update.      Pt reported the following details: stiff shoulder and neck. Patient states that he can not lift his arm due to a burning pain in his arm. He states that he has an appointment with DR. De La Rosa on Monday June 19.   Told patient that the information will be forwarded to her provider.  Also explained that, if a steroid medication was used, it could take up to 14 days to feel the full effect and if pt has any further questions or concerns pt should call the nurse line at [phone].

## 2017-06-13 NOTE — TELEPHONE ENCOUNTER
Spoke with Hermelindo regarding his report of burning arm pain after injection. This is the same pain he was having prior to the injection. Also reports the intermittent numbness he's feeling was present prior to injection as well.    No signs/symptoms of infection. No loss of strength. Has follow up scheduled with referring provider 6/19/17.    Tiffanie Bird, MSN, RN-BC  Care Coordinator  [facility]

## 2017-06-19 ENCOUNTER — OFFICE VISIT (OUTPATIENT)
Dept: ORTHOPEDICS | Facility: CLINIC | Age: 57
End: 2017-06-19
Payer: COMMERCIAL

## 2017-06-19 ENCOUNTER — TELEPHONE (OUTPATIENT)
Dept: PALLIATIVE MEDICINE | Facility: CLINIC | Age: 57
End: 2017-06-19

## 2017-06-19 VITALS
WEIGHT: 152 LBS | DIASTOLIC BLOOD PRESSURE: 64 MMHG | BODY MASS INDEX: 22.51 KG/M2 | HEIGHT: 69 IN | SYSTOLIC BLOOD PRESSURE: 110 MMHG

## 2017-06-19 DIAGNOSIS — M47.812 FACET ARTHROPATHY, CERVICAL: ICD-10-CM

## 2017-06-19 DIAGNOSIS — M54.12 CERVICAL RADICULOPATHY: Primary | ICD-10-CM

## 2017-06-19 DIAGNOSIS — M50.20 HERNIATED CERVICAL INTERVERTEBRAL DISC: ICD-10-CM

## 2017-06-19 DIAGNOSIS — M48.02 SPINAL STENOSIS IN CERVICAL REGION: ICD-10-CM

## 2017-06-19 PROCEDURE — 99213 OFFICE O/P EST LOW 20 MIN: CPT | Performed by: FAMILY MEDICINE

## 2017-06-19 NOTE — PATIENT INSTRUCTIONS
Thank you for allowing us to participate in your care today.  Please find below your visit diagnosis and the plan going forward.    1. Cervical radiculopathy    2. Herniated cervical intervertebral disc    3. Facet arthropathy, cervical (H)    4. Spinal stenosis in cervical region      Will repeat CLEMENTE at the C5-6  Already placed order for comprehensive evaluation and management for pain management    Follow up as needed. Call direct clinic number [[phone]] at any time with questions or concerns.    Mykel De La Rosa DO CAQSM  [facility]  Website: [email or web address]  Twitter: [email or web address]

## 2017-06-19 NOTE — PROGRESS NOTES
ASSESSMENT & PLAN    ICD-10-CM    1. Cervical radiculopathy M54.12    2. Herniated cervical intervertebral disc M50.20    3. Facet arthropathy, cervical (H) M12.88    4. Spinal stenosis in cervical region M48.02    Multifactorial pain  Chronic, multi-level disc disease in cervical spine  Sensory changes and minor weakness on exam  Reports no relief with CLEMENTE however based on last exam/follow-up visit he objectively looks much improved. Pain is only present in a very localized area posterior shoulder/infraspinatus region. No longer appears as guarded with trap hypertonicity  Given incomplete resolution, sensory, weakness and pain over cuff with repeat BENITA at C5-6  Previous order already in for comprehensive evaluation and management and encouraged to pursue    Follow up as needed. Call direct clinic number [phone] at any time with questions or concerns. Instructed to call the office if the condition evolves or worsens.    -----    SUBJECTIVE:  Hermelindo Conley is a 57 year old male who is seen in follow-up for cervical radiculopathy, herniated cervical intervertebral disc, facet arthropathy, spinal stenosis in cervical region. They were last seen 5/30/2017  Patient underwent a cervical BENITA 6/6/17.  Believes he only had about 2 hours of relief from the injection.  Discomfort seems to be increasing since his injection as well as feeling he has limited ROM.  Pain has changed, and is currently right over his right AC joint.  He feels popping with any movement of his shoulder.       Since their last visit reports worsening pain. They indicate that their pain level is 9/10. They have tried CLEMENTE.      Patient's past medical, surgical, social, and family histories were reviewed today and no changes are noted.    REVIEW OF SYSTEMS:  Constitutional: NEGATIVE for fever, chills, change in weight  Skin: NEGATIVE for worrisome rashes, moles or lesions  GI/: NEGATIVE for bowel or bladder changes  Neuro: NEGATIVE for weakness,  "dizziness or paresthesias    OBJECTIVE:  Ht 5' 9\" (1.753 m)  Wt 152 lb (68.9 kg)  BMI 22.45 kg/m2   General: healthy, alert and in no distress  HEENT: no scleral icterus or conjunctival erythema  Skin: no suspicious lesions or rash. No jaundice.  CV: regular rhythm by palpation, no pedal edema  Resp: normal respiratory effort without conversational dyspnea   Psych: normal mood and affect  Gait: normal steady gait with appropriate coordination and balance  Neuro: decreased sensation over C6 dermatome otherwise normal light touch sensory exam of the extremities.  Motor strength as noted below  MSK:  Cervical  Restricted with extension but does not cause radicular pain  5-/5 strength bicep and wrist flexion on R compared to L  Pain with straight abduction    Independent visualization of the below image:  None indicated at this time    Patient's conditions were thoroughly discussed during today's visit with greater than 50% of the visit spent counseling the patient with total time spent face-to-face with the patient being 20 minutes.    Mykel De La Rosa, DO [facility]    "

## 2017-06-19 NOTE — NURSING NOTE
"Chief Complaint   Patient presents with     Musculoskeletal Problem     f/u for neck and shoulder pain       Initial /64  Ht 5' 9\" (1.753 m)  Wt 152 lb (68.9 kg)  BMI 22.45 kg/m2 Estimated body mass index is 22.45 kg/(m^2) as calculated from the following:    Height as of this encounter: 5' 9\" (1.753 m).    Weight as of this encounter: 152 lb (68.9 kg).  Medication Reconciliation: complete  "

## 2017-06-19 NOTE — MR AVS SNAPSHOT
After Visit Summary   6/19/2017    Hermelindo Conley    MRN: [id number]           Patient Information     Date Of Birth          1960        Visit Information        Provider Department      6/19/2017 8:20 AM Mykel De La Rosa DO [facility]        Today's Diagnoses     Cervical radiculopathy    -  1    Herniated cervical intervertebral disc        Facet arthropathy, cervical (H)        Spinal stenosis in cervical region          Care Instructions    Thank you for allowing us to participate in your care today.  Please find below your visit diagnosis and the plan going forward.    1. Cervical radiculopathy    2. Herniated cervical intervertebral disc    3. Facet arthropathy, cervical (H)    4. Spinal stenosis in cervical region      Will repeat CLEMENTE at the C5-6  Already placed order for comprehensive evaluation and management for pain management    Follow up as needed. Call direct clinic number [[phone]] at any time with questions or concerns.    Mykel De La Rosa DO CAQSM  [facility]  Website: [email or web address]  Twitter: [email or web address]            Follow-ups after your visit        Additional Services     PAIN MANAGEMENT CENTER ([place]) REFERRAL       [facility] Referral    Please be aware that coverage of these services is subject to the terms and limitations of your health insurance plan.  Call member services at your health plan with any benefit or coverage questions.      Please bring the following to your appointment:  Any x-rays, CTs or MRIs which have been performed.  Contact the facility where they were done to arrange for  prior to your scheduled appointment.  Any new CT, MRI or other procedures ordered by your specialist must be performed at a [facility] facility or coordinated by your clinic's referral office.    List of current medications   This referral request  Any documents/labs given to you for  this referral      Reason for Consult:  Procedure or injection only - patient will be contacted within 24 hrs to schedule: Epidural Steroid (interlaminar approach): Cervical 5-6. Previous C7-T1 provided no relief. Continued R shoulder pain that appears more cervicogenic.  Please attempt C5-6 and notify if unable to perform.    Please answer the following questions:    What is your diagnosis for this patient's pain?  Cervical radiculopathy, degenerative disc disease, disc herniation    Do you have any specific questions for the pain specialist? No    Are there any red flags that may impact the assessment or management of this patient?    None    ANY DIAGNOSTIC TESTS THAT ARE NOT IN EPIC SHOULD BE SENT TO THE PAIN CENTER    Please note the pre op pain consult must be scheduled 2-3 weeks prior to the patient's surgery. Patient's trying to schedule within 2 weeks of surgery may not be accommodated.    Pre Op Pain Consults are only good for 30 days.    REGARDING OPIOID MEDICATIONS:  We will always address appropriateness of opioid pain medications, but we generally will not automatically take on a prescribing role. When we do take on prescribing of opioids for chronic pain, it is in collaboration with the referring physician for an intermediate period of time (months), with an expectation that the primary physician or provider will assume the prescribing role if medications are effective at stable doses with demonstrated compliance.  Therefore, please do not assume that your prescribing responsibilities end on the day of pain clinic consultation.  Is this agreeable to you? YES    For any questions, contact the [facility] at [phone]                  Who to contact     If you have questions or need follow up information about today's clinic visit or your schedule please contact [facility] directly at [phone].  Normal or non-critical lab and imaging results will be  "communicated to you by MyChart, letter or phone within 4 business days after the clinic has received the results. If you do not hear from us within 7 days, please contact the clinic through [employer] or phone. If you have a critical or abnormal lab result, we will notify you by phone as soon as possible.  Submit refill requests through [employer] or call your pharmacy and they will forward the refill request to us. Please allow 3 business days for your refill to be completed.          Additional Information About Your Visit        [employer] Information     [employer] lets you send messages to your doctor, view your test results, renew your prescriptions, schedule appointments and more. To sign up, go to [email or web address] . Click on \"Log in\" on the left side of the screen, which will take you to the Welcome page. Then click on \"Sign up Now\" on the right side of the page.     You will be asked to enter the access code listed below, as well as some personal information. Please follow the directions to create your username and password.     Your access code is: [email or web address]  Expires: 2017 11:20 AM     Your access code will  in 90 days. If you need help or a new code, please call your [place] clinic or [phone].        Care EveryWhere ID     This is your Care EveryWhere ID. This could be used by other organizations to access your [place] medical records  [id number]        Your Vitals Were     Height BMI (Body Mass Index)                5' 9\" (1.753 m) 22.45 kg/m2           Blood Pressure from Last 3 Encounters:   17 110/64   17 103/82   17 120/80    Weight from Last 3 Encounters:   17 152 lb (68.9 kg)   17 150 lb (68 kg)   17 150 lb (68 kg)              We Performed the Following     PAIN MANAGEMENT CENTER ([place]) REFERRAL        Primary Care Provider Office Phone # Fax #    Aditi Coronel [phone]       [address] " [facility] [address] [place] [address]        Thank you!     Thank you for choosing [facility]  for your care. Our goal is always to provide you with excellent care. Hearing back from our patients is one way we can continue to improve our services. Please take a few minutes to complete the written survey that you may receive in the mail after your visit with us. Thank you!             Your Updated Medication List - Protect others around you: Learn how to safely use, store and throw away your medicines at www.disposemymeds.org.          This list is accurate as of: 6/19/17  8:47 AM.  Always use your most recent med list.                   Brand Name Dispense Instructions for use    traZODone 50 MG tablet    DESYREL    60 tablet    Take 1-2 tablets ( mg) by mouth nightly as needed for sleep       * varenicline 0.5 MG X 11 & 1 MG X 42 tablet    CHANTIX STARTING MONTH MYNOR    53 tablet    Take 0.5 mg tab daily for 3 days, then 0.5 mg tab twice daily for 4 days, then 1 mg twice daily.       * varenicline 1 MG tablet    CHANTIX    56 tablet    Take 1 tablet (1 mg) by mouth 2 times daily       * Notice:  This list has 2 medication(s) that are the same as other medications prescribed for you. Read the directions carefully, and ask your doctor or other care provider to review them with you.

## 2017-06-20 NOTE — TELEPHONE ENCOUNTER
Pre-screening Questions for Radiology Injections:    Injection to be done at which interventional clinic site? [facility]    Procedure ordered by [facility]    Procedure ordered? Cervical Epidural Steroid Injection    What insurance would patient like us to bill for this procedure? [employer]      Worker's comp-Any injection DO NOT SCHEDULE and route to Kaitlyn Golden.      [employer] - [employer] insurance - For SI joint injections, DO NOT SCHEDULE and route Kaitlyn Golden.      [employer]- MBB's must be scheduled at LEAST two weeks apart      [employer] - Any injection besides hip/shoulder/knee joint DO NOT SCHEDULE and route to Kaitlyn Golden. She will obtain PA and call pt back to schedule procedure or notify pt of denial.     HP [employer]-PA REQUIRED FOR NON-BENITA OR Joint injections    Any chance of pregnancy? Not Applicable   If YES, do NOT schedule and route to RN pool    Is an  needed? No     Patient has a drive home? (mandatory) YES:     Is patient taking any blood thinners (plavix, coumadin, jantoven, warfarin, heparin, pradaxa or dabigatran )? No   If hold needed, do NOT schedule, route to RN pool     Is patient taking any aspirin products? No     If more than 325mg/day do NOT schedule; route to RN pool     For CERVICAL procedures, hold all aspirin products for 6 days.      Does the patient have a bleeding or clotting disorder? No     If yes, okay to schedule AND route to RN nurse pool    **For any patients with platelet count <100, must be forwarded to provider**    Is patient diabetic?  No  If YES, have them bring their glucometer.    Does patient have an active infection or treated for one within the past week? No     Is patient currently taking any antibiotics?  No     For patients on chronic, preventative, or prophylactic antibiotics, procedures may be scheduled.     For patients on antibiotics for active or recent infection:    Landy Leavitt [place], [place]-antibiotic course must have  been completed for 4 days    Babita Parker-antibiotic course must have been completed for 7 days    Is patient currently taking any steroid medications? (i.e. Prednisone, Medrol)  No     For patients on steroid medications:    Landy Leavitt [place], [place]-steroid course must have been completed for 4 days    Babita Parker-steroid course must have been completed for 7 days    Reviewed with patient:  If you are started on any steroids or antibiotics between now and your appointment, you must contact us because it may affect our ability to perform your procedure.  Yes    Is patient actively being treated for cancer or immunocompromised, including the spleen having been removed? No    If YES, do NOT schedule and route to RN pool     **For Dr. Montes patients without spleens should have the chart sent to her**    Are you able to get on and off an exam table with minimal or no assistance? Yes  If NO, do NOT schedule and route to RN pool    Are you able to roll over and lay on your stomach with minimal or no assistance? Yes  If NO, do NOT schedule and route to RN pool     Any allergies to contrast dye, iodine, shellfish, or numbing and steroid medications? No  If YES, route to RN pool AND add allergy information to appointment notes    Allergies: Review of patient's allergies indicates no known allergies.        Has the patient had a flu shot or any other vaccinations within 7 days before or after the procedure.  No       Does patient have an MRI/CT?  YES: MRI  (SI joint, hip injections, lumbar sympathetic blocks, and stellate ganglion blocks do not require an MRI)    Was the MRI done w/in the last 3 years?  Yes    Was MRI done at [place]? Yes      If not, where was it done? N/A       If MRI was not done at [place], [facility] or [facility] do NOT schedule and route to nursing.  If pt has an imaging disc, the injection may be scheduled but pt has to bring disc to appt. If they show up w/out disc  the injection cannot be done    Reminders (please tell patient if applicable):       Instructed pt to arrive 30 minutes early for IV start if this is for a cervical procedure, ALL sympathetic (stellate ganglion, hypogastric, or lumbar sympathetic block) and all sedation procedures (RFA, spinal cord stimulation trials).  Not Applicable    -IVs are not routinely placed for Bill and [place] cervical case       If NPO for sedation, informed patient that it is okay to take medications with sips of water (except if they are to hold blood thinners).  Not Applicable   *DO take blood pressure medication if it is prescribed*      If this is for a cervical BENITA, informed patient that aspirin needs to be held for 6 days.   YES: Informed      For all patients not having spinal cord stimulator (SCS) trials or radiofrequency ablations (RFAs), informed patient:  IV sedation is not provided for this procedure.  If you feel that an oral anti-anxiety medication is needed, you can discuss this further with your referring provider or primary care provider.  The Pain Clinic provider will discuss specifics of what the procedure includes at your appointment.  Most procedures last 10-20 minutes.  We use numbing medications to help with any discomfort during the procedure.  Not Applicable      Do not schedule procedures requiring IV placement in the first appointment of the day or first appointment after lunch.       For patients 85 or older we recommend having an adult stay w/ them for the remainder of the day.       Does the patient have any questions?  NO  Kathy Martinez  [facility]

## 2017-06-27 ENCOUNTER — CARE COORDINATION (OUTPATIENT)
Dept: ORTHOPEDICS | Facility: CLINIC | Age: 57
End: 2017-06-27

## 2017-06-27 DIAGNOSIS — M54.12 CERVICAL RADICULOPATHY: Primary | ICD-10-CM

## 2017-06-27 DIAGNOSIS — M47.812 FACET ARTHROPATHY, CERVICAL: ICD-10-CM

## 2017-06-27 DIAGNOSIS — M48.02 SPINAL STENOSIS IN CERVICAL REGION: ICD-10-CM

## 2017-06-27 NOTE — PROGRESS NOTES
[facility] - Procedure Note    Date of Visit: 6/28/2017    Procedure performed: C6-C7 interlaminar epidural steroid injection with fluoroscopic guidance  Diagnosis: Cervical spondylosis; Cervical radiculitis/radiculopathy  : Lary Patel MD  Anesthesia: none    Indications: Hermelindo Conley is a 57 year old male who is seen at the request of Dr. KEELEY De La Rosa for cervical epidural steroid injection. The patient describes Right sided shoulder pain.  This is a repeat cervical BENITA, the first was performed by Dr. Bill on 6/06/2017 at the C7-T1 level.  He did not get pain relief from this injection. The patient has been exhibiting symptoms consistent with cervical intraspinal inflammation and radiculopathy. Symptoms have been persistent, disabling, and intermittently severe. The patient reports minimal improvement with conservative treatment, including PT and medications.    Cervical MRI was done on 5/24/17 which showed   FINDINGS: There is normal alignment of the cervical vertebrae.  Vertebral body heights of the cervical spine are normal. Marrow signal  throughout the cervical vertebrae is within normal limits. There is no  evidence for fracture or pathologic bony lesion of the cervical spine.        There is loss of disc height, disc desiccation and posterior disc  bulging to varying degrees at all levels of the cervical spine.        The cervical spinal cord is normal in contour. There is no abnormal  signal in the cervical spinal cord. There is no evidence for  intrathecal abnormality.      Level by level:      C2-C3: There is facet arthropathy bilaterally, uncinate hypertrophy  bilaterally and a posterior broad-based disc-osteophyte complex. These  findings result in mild left neural foraminal narrowing but no spinal  canal or right foraminal stenosis.      C3-C4: There is facet arthropathy bilaterally, uncinate hypertrophy  bilaterally and a posterior broad-based disc-osteophyte complex with  a  superimposed tiny posterior central disc herniation (protrusion).  These findings result in moderate left foraminal stenosis and mild  right foraminal narrowing but no spinal canal stenosis.      C4-C5: There is facet arthropathy bilaterally, uncinate hypertrophy  bilaterally and a posterior broad-based disc-osteophyte complex with a  superimposed tiny posterior central disc herniation (protrusion).  These findings result in mild bilateral neural foraminal narrowing and  minimal spinal canal narrowing.      C5-C6: There is facet arthropathy bilaterally, uncinate hypertrophy  bilaterally and a posterior broad-based disc-osteophyte complex with a  superimposed small irregularly-shaped posterior broad-based disc  herniation (protrusion). These findings result in mild-moderate left  foraminal narrowing, moderate right foraminal stenosis and mild spinal  canal narrowing.      C6-C7: There is facet arthropathy bilaterally, uncinate hypertrophy  bilaterally and a posterior broad-based disc-osteophyte complex. These  findings result in mild-moderate left foraminal narrowing and mild  right foraminal narrowing as well as minimal spinal canal narrowing.      C7-T1: There is facet arthropathy bilaterally, uncinate hypertrophy  bilaterally and a posterior broad-based disc-osteophyte complex. There  is no spinal canal or neural foraminal stenosis at this level.        IMPRESSION: Diffuse degenerative changes of the cervical spine as  described above.    Allergies:    No Known Allergies     Vitals:  /70  Pulse 83  SpO2 96%    Review of Systems: The patient denies recent fever, chills, illness, use of antibiotics or anticoagulants. All other 10-point review of systems negative.     Procedure: The procedure and risks were explained, and informed written consent was obtained from the patient. Risks include but are not limited to: infection, bleeding, increased pain, and damage to soft tissue, nerve, muscle, and  vasculature structures. After getting informed consent, patient was brought into the procedure suite and was placed in a prone position on the procedure table. A Pause for the Cause was performed. Patient was prepped and draped in sterile fashion.     The C7-T1 interspace was identified with use of fluoroscopy in AP view. A 25-gauge, 1.5 inch needle was used to anesthetize the skin and subcutaneous tissue entry site with a total of 2 ml of 1% lidocaine. Under fluoroscopic visualization, a 22-gauge, 3.5 inch Tuohy epidural needle was slowly advanced towards the epidural space a few millimeters left of midline. The latter part of the needle advancement was guided with fluoroscopy in the lateral view. The epidural space was identified using loss of resistance technique. After negative aspiration for heme and cerebrospinal fluid, a total of 1 mL of non-ionic contrast was injected to confirm needle placement. 9 mL of contrast was wasted. Epidurogram confirmed spread within the posterior epidural space. 2 ml of 10mg/ml of dexamethasone and 1 ml of preservative free 1% lidocaine was injected. The needle was removed.  Images were saved to PACS.    The patient tolerated the procedure well, and there was no evidence of procedural complications. No new sensory or motor deficits were noted following the procedure. The patient was stable and able to ambulate on discharge home. Post-procedure instructions were provided.     Pre-procedure pain score: 5/10 in the neck, 5/10 in the arm  Post-procedure pain score: 0/10 in the neck, 0/10 in the arm    Assessment/Plan: Hermelindo Conley is a 57 year old male s/p cervical interlaminar epidural steroid injection today for cervical spondylosis and radiculitis/radiculopathy.     1. Following today's procedure, the patient was advised to contact the [facility] for any of the following:   Fever, chills, or night sweats   New onset of pain, numbness, or weakness   Any  questions/concerns regarding the procedure  If unable to contact the Pain Center, the patient was instructed to go to a local Emergency Room for any complications.   2. The patient will receive a follow-up call in 1 week.   3. Follow-up with Dr. De La Rosa in 2 weeks for post-procedure evaluation.    [facility]

## 2017-06-27 NOTE — PROGRESS NOTES
Received notification that ordered cervical BENITA cannot be completed.    Has failed C7-T1 CLEMENTE - no relief  Requested intralaminar C5-6    Requested that pain management contact patient to notify/coordinate.    New order placed with [facility]    Mykel De La Rosa DO, CAQSM  [facility]      Case discussed further with Dr. Patel. Repeat C7-T1 injection done today (6/28).  If no improvement will proceed with medial branch block and order placed (R C4,5,6).  Recommends that Hermelindo wait 2 weeks after today's injection prior to proceeding with MBB.    Referral also already in place for comprehensive eval and management by pain going forward.    Mykel De La Rosa DO, CAQSM  [facility]

## 2017-06-28 ENCOUNTER — RADIANT APPOINTMENT (OUTPATIENT)
Dept: GENERAL RADIOLOGY | Facility: CLINIC | Age: 57
End: 2017-06-28
Attending: ANESTHESIOLOGY

## 2017-06-28 ENCOUNTER — RADIOLOGY INJECTION OFFICE VISIT (OUTPATIENT)
Dept: PALLIATIVE MEDICINE | Facility: CLINIC | Age: 57
End: 2017-06-28
Payer: COMMERCIAL

## 2017-06-28 VITALS — OXYGEN SATURATION: 95 % | DIASTOLIC BLOOD PRESSURE: 70 MMHG | HEART RATE: 70 BPM | SYSTOLIC BLOOD PRESSURE: 118 MMHG

## 2017-06-28 DIAGNOSIS — M54.12 CERVICAL RADICULOPATHY: Primary | ICD-10-CM

## 2017-06-28 DIAGNOSIS — M48.02 SPINAL STENOSIS IN CERVICAL REGION: ICD-10-CM

## 2017-06-28 DIAGNOSIS — M47.812 FACET ARTHROPATHY, CERVICAL: ICD-10-CM

## 2017-06-28 DIAGNOSIS — M50.20 HERNIATED CERVICAL INTERVERTEBRAL DISC: ICD-10-CM

## 2017-06-28 DIAGNOSIS — M54.12 CERVICAL RADICULOPATHY: ICD-10-CM

## 2017-06-28 PROCEDURE — 62321 NJX INTERLAMINAR CRV/THRC: CPT | Performed by: ANESTHESIOLOGY

## 2017-06-28 ASSESSMENT — PAIN SCALES - GENERAL
PAINLEVEL: NO PAIN (0)
PAINLEVEL: NO PAIN (0)

## 2017-06-28 NOTE — MR AVS SNAPSHOT
After Visit Summary   6/28/2017    Hermelindo Conley    MRN: [id number]           Patient Information     Date Of Birth          1960        Visit Information        Provider Department      6/28/2017 7:45 AM Lary Patel MD [facility]        Care Instructions    [facility] Procedure Discharge Instructions    Today you saw:         Dr. Lary Patel    Your procedure:  Epidural steroid injection       Medications used:  Lidocaine (anesthetic)     Dexamethasone (steroid)        Omnipaque (contrast)             Be cautious as numbness and/or weakness in the arms or legs may occur up to 6-8 hours after the procedure due to effect of the local anesthetic    Do not drive for 6 hours. The effect of the local anesthetic could slow your reflexes.     Avoid strenuous activity for the first 24 hours. You may resume your regular activities after that.     You may shower, however avoid swimming, tub baths or hot tubs for 24 hours following your procedure    You may have a mild to moderate increase in pain for several days following the injection.      You may use ice packs for 10-15 minutes, 3 to 4 times a day at the injection site for comfort    Do not use heat to painful areas for 6 to 8 hours. This will give the local anesthetic time to wear off and prevent you from accidentally burning your skin.    You may use anti-inflammatory medications (such as Ibuprofen/Advil or Aleve) or Tylenol for pain control if necessary    It may take up to 14 days for the steroid medication to start working although you may feel the effect as early as a few days after the procedure.       If you experience any of the following, call the pain center nursing line during work hours at [phone] or on-call physician after hours at [phone]:  -Fever over 100 degree F  -Swelling, bleeding, redness, drainage, warmth at the injection site  -Progressive weakness or numbness in your legs or arms  -Loss  "of bowel or bladder function  -Unusual headache that is not relieved by Tylenol  -Unusual new onset of pain that is not improving    Phone #s:  Appointment scheduling line: [phone]          Follow-ups after your visit        Future tests that were ordered for you today     Open Future Orders        Priority Expected Expires Ordered    XR Cervic/Thorac Transforaminal Inj Routine 2017            Who to contact     If you have questions or need follow up information about today's clinic visit or your schedule please contact [facility] directly at [phone].  Normal or non-critical lab and imaging results will be communicated to you by SMGBBhart, letter or phone within 4 business days after the clinic has received the results. If you do not hear from us within 7 days, please contact the clinic through eCardiot or phone. If you have a critical or abnormal lab result, we will notify you by phone as soon as possible.  Submit refill requests through [employer] or call your pharmacy and they will forward the refill request to us. Please allow 3 business days for your refill to be completed.          Additional Information About Your Visit        MyChart Information     [employer] lets you send messages to your doctor, view your test results, renew your prescriptions, schedule appointments and more. To sign up, go to [email or web address] . Click on \"Log in\" on the left side of the screen, which will take you to the Welcome page. Then click on \"Sign up Now\" on the right side of the page.     You will be asked to enter the access code listed below, as well as some personal information. Please follow the directions to create your username and password.     Your access code is: [email or web address]  Expires: 2017 11:20 AM     Your access code will  in 90 days. If you need help or a new code, please call your [facility] clinic or [phone].        Care EveryWhere ID     This is your " Care EveryWhere ID. This could be used by other organizations to access your [facility] medical records  [id number]        Your Vitals Were     Pulse Pulse Oximetry                83 96%           Blood Pressure from Last 3 Encounters:   06/28/17 102/70   06/19/17 110/64   06/06/17 103/82    Weight from Last 3 Encounters:   06/19/17 68.9 kg (152 lb)   05/30/17 68 kg (150 lb)   05/02/17 68 kg (150 lb)              Today, you had the following     No orders found for display       Primary Care Provider Office Phone # Fax #    Aditi Harvey Coronel [phone]       [facility] [address] [place] [address]        Equal Access to Services     MILLIE RICHARD : Hadii josé miguel hernandezo Sogemali, waaxda luqadaha, qaybta kaalmada adeegyada, rachael macias . So [facility] [phone].    ATENCIÓN: Si habla español, tiene a mo disposición servicios gratuitos de asistencia lingüística. LlJ.[facility] [phone].    We comply with applicable federal civil rights laws and [place] laws. We do not discriminate on the basis of race, color, national origin, age, disability sex, sexual orientation or gender identity.            Thank you!     Thank you for choosing [facility]  for your care. Our goal is always to provide you with excellent care. Hearing back from our patients is one way we can continue to improve our services. Please take a few minutes to complete the written survey that you may receive in the mail after your visit with us. Thank you!             Your Updated Medication List - Protect others around you: Learn how to safely use, store and throw away your medicines at www.disposemymeds.org.          This list is accurate as of: 6/28/17  8:04 AM.  Always use your most recent med list.                   Brand Name Dispense Instructions for use Diagnosis    traZODone 50 MG tablet    DESYREL    60 tablet    Take 1-2 tablets ( mg) by mouth  nightly as needed for sleep    Primary insomnia       * varenicline 0.5 MG X 11 & 1 MG X 42 tablet    CHANTIX STARTING MONTH MYNOR    53 tablet    Take 0.5 mg tab daily for 3 days, then 0.5 mg tab twice daily for 4 days, then 1 mg twice daily.    Tobacco abuse       * varenicline 1 MG tablet    CHANTIX    56 tablet    Take 1 tablet (1 mg) by mouth 2 times daily    Tobacco abuse       * Notice:  This list has 2 medication(s) that are the same as other medications prescribed for you. Read the directions carefully, and ask your doctor or other care provider to review them with you.

## 2017-06-28 NOTE — PATIENT INSTRUCTIONS
[facility] Procedure Discharge Instructions    Today you saw:         Dr. Lary Patel    Your procedure:  Epidural steroid injection       Medications used:  Lidocaine (anesthetic)     Dexamethasone (steroid)        Omnipaque (contrast)             Be cautious as numbness and/or weakness in the arms or legs may occur up to 6-8 hours after the procedure due to effect of the local anesthetic    Do not drive for 6 hours. The effect of the local anesthetic could slow your reflexes.     Avoid strenuous activity for the first 24 hours. You may resume your regular activities after that.     You may shower, however avoid swimming, tub baths or hot tubs for 24 hours following your procedure    You may have a mild to moderate increase in pain for several days following the injection.      You may use ice packs for 10-15 minutes, 3 to 4 times a day at the injection site for comfort    Do not use heat to painful areas for 6 to 8 hours. This will give the local anesthetic time to wear off and prevent you from accidentally burning your skin.    You may use anti-inflammatory medications (such as Ibuprofen/Advil or Aleve) or Tylenol for pain control if necessary    It may take up to 14 days for the steroid medication to start working although you may feel the effect as early as a few days after the procedure.       If you experience any of the following, call the pain center nursing line during work hours at [phone] or on-call physician after hours at [phone]:  -Fever over 100 degree F  -Swelling, bleeding, redness, drainage, warmth at the injection site  -Progressive weakness or numbness in your legs or arms  -Loss of bowel or bladder function  -Unusual headache that is not relieved by Tylenol  -Unusual new onset of pain that is not improving    Phone #s:  Appointment scheduling line: [phone]

## 2017-06-28 NOTE — NURSING NOTE
Injection intake:    If this procedure is requiring IV sedation has the patient been NPO for 6  Hours? NA    Is patient on a prescribed blood thinner such as coumadin, Plavix, Xarelto?    No    How long was blood thinner held: Na days or NA hours    Does patient take aspirin?  No    If this is for a cervical procedure and patient is on aspirin has it been held for 6 days?   No     Any allergies to contrast dye, iodine, steroid and/or numbing medications?  NO    Is patient currently taking antibiotics or have an active infection?  NO    Is patient currently taking oral steroids? NO    Does patient have a ? yes       Is patient pregnant or breastfeeding?  Not Applicable    Are the vital signs normal?  Yes

## 2017-06-29 ENCOUNTER — TELEPHONE (OUTPATIENT)
Dept: ORTHOPEDICS | Facility: CLINIC | Age: 57
End: 2017-06-29

## 2017-06-29 ENCOUNTER — TELEPHONE (OUTPATIENT)
Dept: PALLIATIVE MEDICINE | Facility: CLINIC | Age: 57
End: 2017-06-29

## 2017-06-29 NOTE — TELEPHONE ENCOUNTER
Patient calls back stating now he just received a call from the Pain Clinic wanting to schedule an injection for 4, 5 and 6. He spoke to triage nurse earlier and is very confused. He states Dr. Patel said we don't go past 6, so he would like this cleared up. He really would like to speak to someone about the confusion.     LEANDER Calixto RN

## 2017-06-29 NOTE — TELEPHONE ENCOUNTER
Patient left voicemail yesterday stating he is confused of plan of care and would like a call asa.   He had a cervical injection yesterday by Dr. Patel which he states was on the 6th vertebra. He received a call from [facility] afterwards stating Dr. De La Rosa ordered another injection of the 5th vertebra. He would like to know why and the plan.     In reviewing the chart, patient had injection of C7-T1 vertebra yesterday. Had a previous same injection by Dr. Bill on 6/6/17 that provided no relief. Not sure why patient is not aware.    Please advise.     LEANDER Calixto RN

## 2017-06-29 NOTE — TELEPHONE ENCOUNTER
Patient calls today regarding message he left yesterday. Would like to speak to Dr. De La Rosa or Jose.     [facility] is telling patient he will have to get a new order and that he can't get another injection for 3-4 weeks.   Informed that there must be some confusion with [facility] as the order was placed for the future if needed.     He is also wanting to know that if another injection is needed in the future, will he have to come in for another appointment before Dr. De La Rosa is able to order another injection. Need to clarify with Dr. De La Rosa and then will get back with him. Apologized for the confusion.    Please advise.     LEANDER Calixto RN

## 2017-06-29 NOTE — TELEPHONE ENCOUNTER
Called to discuss / clear up the confusion. No answer - left message to return my call.    For triage information in the event he calls back and I'm not readily available.    1. Order was placed to have cervical BENITA at [facility].  I was notified the evening prior to his scheduled injection that the level/orientation that I had requested would not be possible.    2. I therefore ordered the injection through [facility] as they have been able to accommodate this request in the past.    3. Given that there wasn't time to communicate this to the patient, he arrived for his appointment the following morning. Dr. Patel reviewed everything with him a C7-T1 injection was completed. The plan going forward, if he does not get any pain relief, is to proceed with a C4, 5, 6 medial branch block.  This is documented in a care coordination encounter dated 6/27.      4. It appears that pain management has already called Hermelindo to schedule this medial branch block, although in my order it states that this should not be done before 2 weeks from the injection completed on 6/28. Unsure if scheduling communicated this to Hermelindo when the called/left message.    At this point the following should occur.  1. Hermelindo is to wait and see if he gets any relief from the injection done on 6/28.    If yes - no further injections needed. I have placed an order for comprehensive eval and management. Unsure if this has been scheduled. If due to staffing he cannot be seen at [facility], then triage/extenders can place order for [facility] - Dr. Bill.    If no relief - then proceed with medial branch block which has already been ordered.    Mykel De La Rosa DO, CAM  [facility]

## 2017-06-30 NOTE — TELEPHONE ENCOUNTER
Patient returns Dr. De La Rosa's call.   Went through Dr. De La Rosa's information below with patient line by line.   Apologized for the confusion.   Patient leaving for vacation on 7/5 and will return 7/15/17.   So far he has not had any relief from cervical BENITA.  Having a lot of shoulder and arm discomfort today.  Patient informed if no relief when he returns from vacation to set up medial branch block with the pain clinic.   He verbalized understanding.     LEANDER Calixto RN

## 2017-07-03 ENCOUNTER — OFFICE VISIT (OUTPATIENT)
Dept: PEDIATRICS | Facility: CLINIC | Age: 57
End: 2017-07-03
Payer: COMMERCIAL

## 2017-07-03 VITALS
DIASTOLIC BLOOD PRESSURE: 74 MMHG | WEIGHT: 153 LBS | HEIGHT: 69 IN | TEMPERATURE: 98.3 F | SYSTOLIC BLOOD PRESSURE: 118 MMHG | BODY MASS INDEX: 22.66 KG/M2 | HEART RATE: 94 BPM | OXYGEN SATURATION: 97 %

## 2017-07-03 DIAGNOSIS — F51.01 PRIMARY INSOMNIA: ICD-10-CM

## 2017-07-03 DIAGNOSIS — Z72.0 TOBACCO ABUSE: ICD-10-CM

## 2017-07-03 DIAGNOSIS — N52.9 ERECTILE DYSFUNCTION, UNSPECIFIED ERECTILE DYSFUNCTION TYPE: Primary | ICD-10-CM

## 2017-07-03 PROCEDURE — 99214 OFFICE O/P EST MOD 30 MIN: CPT | Performed by: INTERNAL MEDICINE

## 2017-07-03 RX ORDER — SILDENAFIL 50 MG/1
50 TABLET, FILM COATED ORAL DAILY PRN
Qty: 4 TABLET | Refills: 1 | Status: SHIPPED | OUTPATIENT
Start: 2017-07-03 | End: 2017-07-03

## 2017-07-03 RX ORDER — SILDENAFIL CITRATE 20 MG/1
60 TABLET ORAL DAILY PRN
Qty: 12 TABLET | Refills: 0 | Status: SHIPPED | OUTPATIENT
Start: 2017-07-03 | End: 2018-08-30

## 2017-07-03 NOTE — MR AVS SNAPSHOT
"              After Visit Summary   7/3/2017    Hermelindo Conley    MRN: [id number]           Patient Information     Date Of Birth          1960        Visit Information        Provider Department      7/3/2017 3:00 PM Aditi Coronel MD [facility]        Today's Diagnoses     Erectile dysfunction, unspecified erectile dysfunction type    -  1    Tobacco abuse        Primary insomnia           Follow-ups after your visit        Who to contact     If you have questions or need follow up information about today's clinic visit or your schedule please [facility] directly at [phone].  Normal or non-critical lab and imaging results will be communicated to you by [employer]hart, letter or phone within 4 business days after the clinic has received the results. If you do not hear from us within 7 days, please contact the clinic through [employer] or phone. If you have a critical or abnormal lab result, we will notify you by phone as soon as possible.  Submit refill requests through [employer] or call your pharmacy and they will forward the refill request to us. Please allow 3 business days for your refill to be completed.          Additional Information About Your Visit        MyChart Information     [employer] lets you send messages to your doctor, view your test results, renew your prescriptions, schedule appointments and more. To sign up, go to [email or web address] . Click on \"Log in\" on the left side of the screen, which will take you to the Welcome page. Then click on \"Sign up Now\" on the right side of the page.     You will be asked to enter the access code listed below, as well as some personal information. Please follow the directions to create your username and password.     Your access code is: [email or web address]  Expires: 2017 11:20 AM     Your access code will  in 90 days. If you need help or a new code, please call your [facility] or [phone].        Care " "EveryWhere ID     This is your Care EveryWhere ID. This could be used by other organizations to access your [facility] medical records  [id number]        Your Vitals Were     Pulse Temperature Height Pulse Oximetry BMI (Body Mass Index)       94 98.3  F (36.8  C) (Tympanic) 5' 9\" (1.753 m) 97% 22.59 kg/m2        Blood Pressure from Last 3 Encounters:   07/03/17 118/74   06/28/17 118/70   06/19/17 110/64    Weight from Last 3 Encounters:   07/03/17 153 lb (69.4 kg)   06/19/17 152 lb (68.9 kg)   05/30/17 150 lb (68 kg)              Today, you had the following     No orders found for display         Today's Medication Changes          These changes are accurate as of: 7/3/17  3:34 PM.  If you have any questions, ask your nurse or doctor.               Start taking these medicines.        Dose/Directions    sildenafil 20 MG tablet   Commonly known as:  REVATIO/VIAGRA   Used for:  Erectile dysfunction, unspecified erectile dysfunction type   Started by:  Aditi Coronel MD        Dose:  60 mg   Take 3 tablets (60 mg) by mouth daily as needed Take 1 tablet (20 mg) by mouth three times daily about 30 minutes before sexual intercourse  Never use with nitroglycerin, terazosin or doxazosin.   Quantity:  12 tablet   Refills:  0            Where to get your medicines      Some of these will need a paper prescription and others can be bought over the counter.  Ask your nurse if you have questions.     Bring a paper prescription for each of these medications     sildenafil 20 MG tablet                Primary Care Provider Office Phone # Fax #    Aditi Coronel [phone]       [facility] [address] [place] [address]        Equal Access to Services     [facility] AH: Hadii josé miguel Mojica, faina chahal, rachael narayan. So [facility] [phone].    ATENCIÓN: Si habla español, tiene a mo disposición servicios " rosa de asistencia lingüística. Dara linda [phone].    We comply with applicable federal civil rights laws and [place] laws. We do not discriminate on the basis of race, color, national origin, age, disability sex, sexual orientation or gender identity.            Thank you!     Thank you for choosing [facility]  for your care. Our goal is always to provide you with excellent care. Hearing back from our patients is one way we can continue to improve our services. Please take a few minutes to complete the written survey that you may receive in the mail after your visit with us. Thank you!             Your Updated Medication List - Protect others around you: Learn how to safely use, store and throw away your medicines at www.disposemymeds.org.          This list is accurate as of: 7/3/17  3:34 PM.  Always use your most recent med list.                   Brand Name Dispense Instructions for use Diagnosis    sildenafil 20 MG tablet    REVATIO/VIAGRA    12 tablet    Take 3 tablets (60 mg) by mouth daily as needed Take 1 tablet (20 mg) by mouth three times daily about 30 minutes before sexual intercourse  Never use with nitroglycerin, terazosin or doxazosin.    Erectile dysfunction, unspecified erectile dysfunction type       traZODone 50 MG tablet    DESYREL    60 tablet    Take 1-2 tablets ( mg) by mouth nightly as needed for sleep    Primary insomnia       * varenicline 0.5 MG X 11 & 1 MG X 42 tablet    CHANTIX STARTING MONTH MYNOR    53 tablet    Take 0.5 mg tab daily for 3 days, then 0.5 mg tab twice daily for 4 days, then 1 mg twice daily.    Tobacco abuse       * varenicline 1 MG tablet    CHANTIX    56 tablet    Take 1 tablet (1 mg) by mouth 2 times daily    Tobacco abuse       * Notice:  This list has 2 medication(s) that are the same as other medications prescribed for you. Read the directions carefully, and ask your doctor or other care provider to review them with you.

## 2017-07-03 NOTE — PROGRESS NOTES
"  SUBJECTIVE:                                                    Hermelindo Conley is a 57 year old male who presents to clinic today for the following health issues:      Patient requesting script for Viagra, 4 tabs.  Brenda Moran LPN    1. Neck/shoulder - has had 2 injections in neck area. Had 2nd injection yesterday. Pain clinic called today and recommended another procedure - ?medial branch block. Continues to have pain.    2. Tobacco - now smoking 1.5-2 /day. Still having urges to smoke. Continue on chantix. No side effects.     3. Insomnia - has been doing ok as long as takes the trazodone. Cannot take 2 pills in 1 night. If takes 1 pill a night, sleeps about 5-6 hours. If takes 2 pills does not wake and wets the bed.    4. ED - pt reports difficulty getting and maintaining an erection. No history of heart disease. No chest pain. Has never tried anything in the past.     Reviewed and updated as needed this visit by clinical staff  Tobacco  Allergies  Meds  Problems  Med Hx  Surg Hx  Fam Hx  Soc Hx        Reviewed and updated as needed this visit by Provider  Allergies  Meds  Problems         -------------------------------------    Problem list and histories reviewed & adjusted, as indicated.  Additional history: as documented    ROS:  Constitutional, HEENT, cardiovascular, pulmonary, gi and gu systems are negative, except as otherwise noted.    Problem list, Medication list, Allergies, and Medical/Social/Surgical histories reviewed in EPIC and updated as appropriate.    OBJECTIVE:                                                    /74 (BP Location: Left arm, Patient Position: Chair, Cuff Size: Adult Regular)  Pulse 94  Temp 98.3  F (36.8  C) (Tympanic)  Ht 5' 9\" (1.753 m)  Wt 153 lb (69.4 kg)  SpO2 97%  BMI 22.59 kg/m2   Body mass index is 22.59 kg/(m^2).  General Appearance: healthy, alert and no distress  Eyes:   no discharge, erythema.  Normal pupils.  Respiratory: lungs clear to " auscultation - no rales, rhonchi or wheezes.  Cardiovascular: regular rate and rhythm, normal S1 S2, no S3 or S4 and no murmur, click or rub.  No peripheral edema.  Skin: no rashes or lesions.  Well perfused and normal turgor.    Diagnostic Test Results:  none      ASSESSMENT/PLAN:                                                      (N52.9) Erectile dysfunction, unspecified erectile dysfunction type  (primary encounter diagnosis)  Plan: sildenafil (REVATIO/VIAGRA) 20 MG tablet,         DISCONTINUED: sildenafil (REVATIO/VIAGRA) 50 MG        cap/tab  - discussed possible side effects  - rx given for both 50 mg viagra and 20 mg sildenafil - discussed insurance may not cover.     (Z72.0) Tobacco abuse  Comment: has drastically cut back, but still smoking  Plan: continue chantix    (F51.01) Primary insomnia  Comment: much improved  Plan:   - continue trazodone 50 mg at bedtime as needed  - may try 75 mg, if needed    Tobacco Cessation:   reports that he has been smoking Cigarettes.  He does not have any smokeless tobacco history on file.  Tobacco Cessation Action Plan: Pharmacotherapies : Chantix    Follow up with Provider - annual visit and as needed     [facility]

## 2017-07-05 ENCOUNTER — TELEPHONE (OUTPATIENT)
Dept: PEDIATRICS | Facility: CLINIC | Age: 57
End: 2017-07-05

## 2017-07-05 NOTE — TELEPHONE ENCOUNTER
Noted. Rx given for patient to pay out of pocket as off label use for ED.    Aditi Coronel MD  Internal Medicine/Pediatrics

## 2017-07-05 NOTE — TELEPHONE ENCOUNTER
Received denial of coverage for Viagra without diagnosis of Pulmonary Hypertension. Called patient and HERVE.  Brenda Moran LPN

## 2017-07-05 NOTE — TELEPHONE ENCOUNTER
[facility] Prior Authorization Team   Phone: [phone]  Fax: [phone]      PRIOR AUTHORIZATION DENIED    Medication: sildenafil (REVATIO/VIAGRA) 20 MG tablet    Denial Date: 7/5/2017    Denial Rational:          Appeal Information:

## 2017-07-17 NOTE — TELEPHONE ENCOUNTER
Pre-screening questions for Radiology Injections:    Injection to be done at which interventional clinic site? [facility]    Procedure ordered by Dr. De La Rosa    Procedure ordered? Cervical Medial Branch Block    What insurance would patient like us to bill for this procedure? Blue plus      Worker's comp- Any injection DO NOT SCHEDULE and route to Kathy Michelle.      [employer] insurance - If scheduling an SI joint injection DO NOT SCHEDULE and route to Kaitlyn Golden.     [employer]- MBB's must be scheduled at LEAST two weeks apart      [employer] - Any injection besides hip/shoulder/knee joint DO NOT SCHEDULE and route to Kaitlyn Golden. She will obtain PA and call pt back to schedule procedure or notify pt of denial.     [employer]- PA required for all MBB's    Is an  needed? No     Patient has a drive home? (mandatory) Yes     Is patient taking any blood thinners (plavix, coumadin, jantoven, warfarin, heparin, pradaxa or dabigatran )? No   (If so, do not schedule, contact RN and/or MD)     Is patient taking any aspirin products? No   (If more than 325mg/day do not schedule; Contact RN/MD. For all non-cervical interventional procedures if patient is taking MORE than 325mg/day, limit aspirin to 81-325mg/day x 1 week. No hold required day of procedure.  For CERVICAL procedures, hold all aspirin products for 6 days.)      Does the patient have a bleeding or clotting disorder? No   (If yes, okay to schedule, but contact RN/MD).  **For any patients with platelet count <100, must be forwarded to provider**    Is patient diabetic? NO If YES, have them bring their glucometer.    Does patient have an active infection or treated for one within the past week? No    Is patient currently taking any antibiotics?  No  For patients on chronic, preventative, or prophylactic antibiotics, procedures can be scheduled.   For patients on antibiotics for active or recent infection:  Babita Ozuna, Alexandre Bill,  Jorge-antibiotic course must have been completed for 4 days  Babita Parker-antibiotic course must have been completed for 7 days    Is patient currently taking any steroid medications? (i.e. Prednisone, Medrol)  No   For patients on steroid medications:  Landy Leavitt [place], Burton-steroid course must have been completed for 4 days  Babita Parker-steroid course must have been completed for 7 days    Review with patient:  If you are started on any steroids or antibiotics between now and your appointment, you must contact us because it may affect our ability to perform your procedure     Is patient actively being treated for cancer or immunocompromised, including the spleen having been removed? No  **For Dr. Montes patients without spleens should have the chart sent to her**  (If YES, do NOT schedule and route to RN)    Are you able to get on and off an exam table with minimal or no assistance? Yes  (If NO, do NOT schedule and route to RN)  Are you able to roll over and lay on your stomach with minimal or no assistance? Yes  (If NO, do NOT schedule and route to RN)         Any allergies to contrast dye, iodine, shellfish, or numbing and steroid medications? No  (If so, inform nursing and note in scheduling comments.)    Allergies: Review of patient's allergies indicates no known allergies.      Any chance of pregnancy? No    Has the patient had a flu shot or any other vaccinations within 7 days before or after the procedure.    No       Does patient have an MRI/CT? Yes  (SI joint, hip injections, lumbar sympathetic blocks, and stellate ganglion blocks do not require an MRI)    If so, was it done at [place]? Yes      If not, where was it done?      Was the MRI done w/in the last 3 years? Yes     If MRI was not done at [place], [facility] or [facility] do NOT schedule. Route to nursing.  (If pt has disc the injection can be scheduled but pt has to bring disc to appt. If they show up w/out  disc the injection cannot be done)      **Must be scheduled with elapsed time interval of at least 2 weeks and not more than 6 months between the First MBB and the Second MBB**       Medial Branch Block Pre-Procedure Instructions    It is okay to take long acting pain medications (if you are on them) the day of the procedure but try not to take any short acting medications unless absolutely necessary.         Long acting meds would include: Gabapentin (Neurontin), MS Contin, Oxycontin        Short acting meds would include:  Percocet, Oxycodone, Vicodin, Ibuprofen     The day of the procedure, you should try to do things that provoke your pain, since the injection is being done to see if it will relieve your pain .     If your pain level is a 4 out of 10 or less on the day of the procedure, please call [phone] to reschedule.        Reminders (please tell patient if applicable):        Instructed pt to arrive 30 minutes early for IV start if this is for a cervical procedure, ALL sympathetic (stellate ganglion, hypogastric, or lumbar sympathetic block) and all sedation procedures (RFA, spinal cord stimulation trials).         -IVs are not routinely placed for Bill and Egyhazi cervical cases       If NPO for sedation, it is okay to take medications with sips of water (except if they are to hold blood thinners).    *DO take blood pressure medication if it is prescribed*      If this is for a cervical MBB aspirin needs to be held for 6 days.        Do not schedule procedures requiring IV placement in the first appointment after lunch         For patients 85 or older we recommend having an adult stay w/ them for the remainder of the day.              Does the patient have any questions? NO

## 2017-08-16 ENCOUNTER — RADIANT APPOINTMENT (OUTPATIENT)
Dept: GENERAL RADIOLOGY | Facility: CLINIC | Age: 57
End: 2017-08-16
Attending: ANESTHESIOLOGY

## 2017-08-16 ENCOUNTER — RADIOLOGY INJECTION OFFICE VISIT (OUTPATIENT)
Dept: PALLIATIVE MEDICINE | Facility: CLINIC | Age: 57
End: 2017-08-16
Payer: COMMERCIAL

## 2017-08-16 VITALS — HEART RATE: 64 BPM | OXYGEN SATURATION: 100 % | SYSTOLIC BLOOD PRESSURE: 134 MMHG | DIASTOLIC BLOOD PRESSURE: 89 MMHG

## 2017-08-16 DIAGNOSIS — M47.812 CERVICAL SPONDYLOSIS WITHOUT MYELOPATHY: Primary | ICD-10-CM

## 2017-08-16 DIAGNOSIS — M54.12 CERVICAL RADICULOPATHY: ICD-10-CM

## 2017-08-16 DIAGNOSIS — M47.812 FACET ARTHROPATHY, CERVICAL: ICD-10-CM

## 2017-08-16 DIAGNOSIS — M48.02 SPINAL STENOSIS IN CERVICAL REGION: ICD-10-CM

## 2017-08-16 PROCEDURE — 64491 INJ PARAVERT F JNT C/T 2 LEV: CPT | Mod: RT | Performed by: ANESTHESIOLOGY

## 2017-08-16 PROCEDURE — 64490 INJ PARAVERT F JNT C/T 1 LEV: CPT | Mod: RT | Performed by: ANESTHESIOLOGY

## 2017-08-16 NOTE — MR AVS SNAPSHOT
After Visit Summary   8/16/2017    Hermelindo Conley    MRN: [id number]           Patient Information     Date Of Birth          1960        Visit Information        Provider Department      8/16/2017 8:15 AM Lary Patel MD [facility]        Care Instructions    [facility]   Medial Branch Block Discharge Instructions  Nurse line:  [phone]      Your procedure was performed by:  Dr. Patel  Medications used:  Bupivicaine (anesthetic)        You will need to complete the Pain Scale Log form and return it to us as soon as possible.  Once we have received the form, we will review it and call you to determine the next steps.     The form can be faxed to [phone] or mailed to:   [facility] [address], [facility] [address] [phone]      You may resume your regular activity after the injection.    Be cautious with walking since you may have numbness and/or weakness in the legs for up to 6-8 hours after the procedure due to the effects of the local anesthetic.    Avoid driving for 6 hours. The local anesthetic could slow your reflexes    You may shower, however no swimming or tub baths or hot tubs for 24 hours following your procedure.    Your pain will return after the numbing medications have worn off.  You may use your current pain medications as needed.    You may use anti-inflammatory medications (such as Ibuprofen, Aleve, or Advil) or Tylenol for pain control if necessary.  Some people find it helpful to alternate Ibuprofen and Tylenol every 3 hours for a couple of days.    You may use ice packs 10-15 minutes three to four times a day at the injection site for comfort.    If you experience any of the following, call the pain center line during work hours at [phone] or the on-call after hours physician line is [phone]:  -Fever over 100 degree F  -Swelling, bleeding, redness,  "drainage, warmth at the injection site  -Progressive weakness or numbness of your  arms  -Unusual new onset of pain that is not improving            Follow-ups after your visit        Who to contact     If you have questions or need follow up information about today's clinic visit or your schedule please contact [facility] directly at [phone].  Normal or non-critical lab and imaging results will be communicated to you by MyChart, letter or phone within 4 business days after the clinic has received the results. If you do not hear from us within 7 days, please contact the clinic through [facility] or phone. If you have a critical or abnormal lab result, we will notify you by phone as soon as possible.  Submit refill requests through [employer] or call your pharmacy and they will forward the refill request to us. Please allow 3 business days for your refill to be completed.          Additional Information About Your Visit        MyChart Information     [facility] lets you send messages to your doctor, view your test results, renew your prescriptions, schedule appointments and more. To sign up, go to [email or web address] . Click on \"Log in\" on the left side of the screen, which will take you to the Welcome page. Then click on \"Sign up Now\" on the right side of the page.     You will be asked to enter the access code listed below, as well as some personal information. Please follow the directions to create your username and password.     Your access code is: [email or web address]  Expires: 2017  8:42 AM     Your access code will  in 90 days. If you need help or a new code, please call your [facility] clinic or [phone].        Care EveryWhere ID     This is your Care EveryWhere ID. This could be used by other organizations to access your [facility] medical records  [id number]        Your Vitals Were     Pulse Pulse Oximetry                62 96%           Blood Pressure from Last 3 Encounters: "   08/16/17 136/72   07/03/17 118/74   06/28/17 118/70    Weight from Last 3 Encounters:   07/03/17 69.4 kg (153 lb)   06/19/17 68.9 kg (152 lb)   05/30/17 68 kg (150 lb)              Today, you had the following     No orders found for display       Primary Care Provider Office Phone # Fax #    Aditi Coronel [phone] [address] [place] [address]        Equal Access to Services     [facility]: Hadii aad ku hadasho Soomaali, waaxda luqadaha, qaybta kaalmada adeegyada, waxay idiin hayaan ermias macias . So [facility] [phone].    ATENCIÓN: Si habla español, tiene a mo disposición servicios gratuitos de asistencia lingüística. [facility] [phone].    We comply with applicable federal civil rights laws and [place] laws. We do not discriminate on the basis of race, color, national origin, age, disability sex, sexual orientation or gender identity.            Thank you!     Thank you for choosing [facility]  for your care. Our goal is always to provide you with excellent care. Hearing back from our patients is one way we can continue to improve our services. Please take a few minutes to complete the written survey that you may receive in the mail after your visit with us. Thank you!             Your Updated Medication List - Protect others around you: Learn how to safely use, store and throw away your medicines at www.disposemymeds.org.          This list is accurate as of: 8/16/17  8:42 AM.  Always use your most recent med list.                   Brand Name Dispense Instructions for use Diagnosis    sildenafil 20 MG tablet    REVATIO/VIAGRA    12 tablet    Take 3 tablets (60 mg) by mouth daily as needed Take 1 tablet (20 mg) by mouth three times daily about 30 minutes before sexual intercourse  Never use with nitroglycerin, terazosin or doxazosin.    Erectile dysfunction, unspecified erectile dysfunction type       traZODone 50 MG tablet     DESYREL    60 tablet    Take 1-2 tablets ( mg) by mouth nightly as needed for sleep    Primary insomnia       * varenicline 0.5 MG X 11 & 1 MG X 42 tablet    CHANTIX STARTING MONTH MYNOR    53 tablet    Take 0.5 mg tab daily for 3 days, then 0.5 mg tab twice daily for 4 days, then 1 mg twice daily.    Tobacco abuse       * varenicline 1 MG tablet    CHANTIX    56 tablet    Take 1 tablet (1 mg) by mouth 2 times daily    Tobacco abuse       * Notice:  This list has 2 medication(s) that are the same as other medications prescribed for you. Read the directions carefully, and ask your doctor or other care provider to review them with you.

## 2017-08-16 NOTE — PATIENT INSTRUCTIONS
[facility]   Medial Branch Block Discharge Instructions  Nurse line:  [phone]      Your procedure was performed by:  Dr. Patel  Medications used:  Bupivicaine (anesthetic)        You will need to complete the Pain Scale Log form and return it to us as soon as possible.  Once we have received the form, we will review it and call you to determine the next steps.     The form can be faxed to [phone] or mailed to:   [facility] - [address], [facility] [address] [phone]      You may resume your regular activity after the injection.    Be cautious with walking since you may have numbness and/or weakness in the legs for up to 6-8 hours after the procedure due to the effects of the local anesthetic.    Avoid driving for 6 hours. The local anesthetic could slow your reflexes    You may shower, however no swimming or tub baths or hot tubs for 24 hours following your procedure.    Your pain will return after the numbing medications have worn off.  You may use your current pain medications as needed.    You may use anti-inflammatory medications (such as Ibuprofen, Aleve, or Advil) or Tylenol for pain control if necessary.  Some people find it helpful to alternate Ibuprofen and Tylenol every 3 hours for a couple of days.    You may use ice packs 10-15 minutes three to four times a day at the injection site for comfort.    If you experience any of the following, call the pain center line during work hours at [phone] or the on-call after hours physician line is [phone]:  -Fever over 100 degree F  -Swelling, bleeding, redness, drainage, warmth at the injection site  -Progressive weakness or numbness of your  arms  -Unusual new onset of pain that is not improving

## 2017-08-17 ENCOUNTER — TELEPHONE (OUTPATIENT)
Dept: PALLIATIVE MEDICINE | Facility: CLINIC | Age: 57
End: 2017-08-17

## 2017-08-17 NOTE — TELEPHONE ENCOUNTER
"Routing to provider to verify OK to proceed with MBB#2. If ok please route to  for scheduling.    Patient received: 100% relief for right facet and 90% relief for activity    Patient is s/p Cervical medial branch block (#1) on 08/16/17.   Post-procedure pain log reviewed:    Pre-procedure: 6/10 rest, , 7/10 right facet load, 10/10 activity     10 minutes: 2/10 rest,  0/10 right facet load, 1/10 activity     1 hour: 2/10 rest, 1/10 right facet load, 1/10 activity     2 hour: 2/10 rest,  2/10 right facet load, 2/10 activity     3 hour: 3/10 rest,  3/10 right facet load, 3/10 activity     4 hour: 3/10 rest, 3/10 right facet load, 2/10 activity     5 hour: 5/10 rest,, 5/10 right facet load, 5/10 activity     6 hour: 5/10 rest, , 6/10 right facet load, 6/10 activity     7 hour: 6-7/10 rest, , 7/10 right facet load, 7/10 activity     8 hour: 7/10 rest, 7/10 right facet load, 7/10 activity     Day after: 6-7/10 rest, 6-7/10 right facet load, 6-7/10 activity     Patient would like to proceed with MBB #2    Pt last completed \"one month ago\" at \"your facility\" approx: 6-8 sessions    MBB form placed in bin for scanning             "

## 2017-08-18 NOTE — TELEPHONE ENCOUNTER
Patient wants to wait until he gets his new work schedule before he schedules.   Will call back on Monday to for this.       Santa NAPIER    [facility]

## 2017-09-19 NOTE — PROGRESS NOTES
[facility] - Procedure Note    Date of Service: 9/20/2017    Procedure performed: Right C4,5,6 medial branch blocks #2   Diagnosis: Cervical spondylosis; Cervical facet arthropathy  : Lary Patel MD & Saul Hunt DO (pain fellow)     Indications: Hermelindo Conley is a 57 year old male who is seen at the request of Mykel De La Rosa DO for cervical medial branch blocks. The patient describes right sided neck pain radiating to his right shoulder worse with neck extension/rotation. The patient reports minimal improvement with conservative treatment, including PT, medications and previous cervical BENITA's in the past.    S/P medial branch block #1 on 8/16/2017 with good pain relief    MRI was done on 5/24/2017 which showed   FINDINGS: There is normal alignment of the cervical vertebrae.  Vertebral body heights of the cervical spine are normal. Marrow signal  throughout the cervical vertebrae is within normal limits. There is no  evidence for fracture or pathologic bony lesion of the cervical spine.      There is loss of disc height, disc desiccation and posterior disc  bulging to varying degrees at all levels of the cervical spine.       The cervical spinal cord is normal in contour. There is no abnormal  signal in the cervical spinal cord. There is no evidence for  intrathecal abnormality.     Level by level:     C2-C3: There is facet arthropathy bilaterally, uncinate hypertrophy  bilaterally and a posterior broad-based disc-osteophyte complex. These  findings result in mild left neural foraminal narrowing but no spinal  canal or right foraminal stenosis.     C3-C4: There is facet arthropathy bilaterally, uncinate hypertrophy  bilaterally and a posterior broad-based disc-osteophyte complex with a  superimposed tiny posterior central disc herniation (protrusion).  These findings result in moderate left foraminal stenosis and mild  right foraminal narrowing but no spinal canal  stenosis.     C4-C5: There is facet arthropathy bilaterally, uncinate hypertrophy  bilaterally and a posterior broad-based disc-osteophyte complex with a  superimposed tiny posterior central disc herniation (protrusion).  These findings result in mild bilateral neural foraminal narrowing and  minimal spinal canal narrowing.     C5-C6: There is facet arthropathy bilaterally, uncinate hypertrophy  bilaterally and a posterior broad-based disc-osteophyte complex with a  superimposed small irregularly-shaped posterior broad-based disc  herniation (protrusion). These findings result in mild-moderate left  foraminal narrowing, moderate right foraminal stenosis and mild spinal  canal narrowing.     C6-C7: There is facet arthropathy bilaterally, uncinate hypertrophy  bilaterally and a posterior broad-based disc-osteophyte complex. These  findings result in mild-moderate left foraminal narrowing and mild  right foraminal narrowing as well as minimal spinal canal narrowing.     C7-T1: There is facet arthropathy bilaterally, uncinate hypertrophy  bilaterally and a posterior broad-based disc-osteophyte complex. There  is no spinal canal or neural foraminal stenosis at this level.         IMPRESSION: Diffuse degenerative changes of the cervical spine as  described above.    Allergies:    No Known Allergies     Vitals:  /71  Pulse 72  SpO2 98%    Review of Systems: The patient denies recent fever, chills, illness, use of antibiotics or anticoagulants. All other 10-point review of systems negative.     Procedure:   Options/alternatives, benefits and risks were discussed with the patient including bleeding, infection, tissue trauma, exposure to radiation, reaction to medications, spinal cord injury, weakness, numbness and paralysis.  Questions were answered to his satisfaction and he agrees to proceed. Voluntary informed consent was obtained and signed.     After getting informed consent, patient was brought into the  procedure suite and was placed in a side-lying position opposite the side of the procedure on the procedure table. A Pause for the Cause was performed. Patient was prepped and draped in sterile fashion.     Under AP fluoroscopic guidance the Right  C4,5,6 articular pillars were identified. The C-arm was rotated to afford optimal visualization. Under intermittent fluoroscopy, a 22 gauge 1.5 inch needle was advanced slowly until it had contact on the mid portion of the articular pillar. Then, the two other needles of the same size and gauge were placed under fluorsoscopic guidance using the same technique. The needle positions were verified and optimized from the AP and lateral views.    The anatomic targets for the C4,5,6 medial nerves were the  articular pillars, resulting in blockade of the C4,5 facet joints.    After negative aspiration, Lidocaine 2.0% 0.75 ml was injected at each location.  The needles were removed. Hemostasis was achieved, the area was cleaned, and bandaids were placed when appropriate. The patient tolerated the procedure well, and was taken to the recovery room. Images were saved to PACS.    Assessment/Plan: Hermelindo Conley is a 57 year old male s/p C4,5,6 medial branch block #2 today for cervical spondylosis, facet arthropathy.     Pre procecedure pain score: 5/10   Post procedure pain score: 0/10.   The patient will continue to monitor progress, and they were given a pain diary to complete at home.  They will either fax or mail this back to us or bring it to their next appointment. We will determine the treatment plan after we review the diary.      1. The patient was advised to contact the [facility] for any of the following:   Fever, chills, or night sweats   New onset of pain, numbness, or weakness   Any questions/concerns regarding the procedure  If unable to contact the Pain Center, the patient was instructed to go to a local Emergency Room for any complications.   2.  The patient will receive a follow-up call in 1 week.  3. We will await the pain diary to determent next step of the treatment plan and call patient to schedule.    [facility]

## 2017-09-20 ENCOUNTER — RADIANT APPOINTMENT (OUTPATIENT)
Dept: GENERAL RADIOLOGY | Facility: CLINIC | Age: 57
End: 2017-09-20
Attending: ANESTHESIOLOGY

## 2017-09-20 ENCOUNTER — RADIOLOGY INJECTION OFFICE VISIT (OUTPATIENT)
Dept: PALLIATIVE MEDICINE | Facility: CLINIC | Age: 57
End: 2017-09-20
Payer: COMMERCIAL

## 2017-09-20 VITALS — DIASTOLIC BLOOD PRESSURE: 76 MMHG | OXYGEN SATURATION: 99 % | SYSTOLIC BLOOD PRESSURE: 114 MMHG | HEART RATE: 64 BPM

## 2017-09-20 DIAGNOSIS — M54.12 CERVICAL RADICULOPATHY: ICD-10-CM

## 2017-09-20 DIAGNOSIS — M47.812 CERVICAL SPONDYLOSIS WITHOUT MYELOPATHY: Primary | ICD-10-CM

## 2017-09-20 PROCEDURE — 64490 INJ PARAVERT F JNT C/T 1 LEV: CPT | Mod: RT | Performed by: ANESTHESIOLOGY

## 2017-09-20 PROCEDURE — 64491 INJ PARAVERT F JNT C/T 2 LEV: CPT | Mod: RT | Performed by: ANESTHESIOLOGY

## 2017-09-20 ASSESSMENT — PAIN SCALES - GENERAL: PAINLEVEL: MODERATE PAIN (5)

## 2017-09-20 NOTE — NURSING NOTE
Injection intake:    If this procedure is requiring IV sedation has the patient been NPO for 6  Hours? NA    Is patient on a prescribed blood thinner such as coumadin, Plavix, Xarelto?    No    Does patient take aspirin?  No    If this is for a cervical procedure and patient is on aspirin has it been held for 6 days?   NA    Any allergies to contrast dye, iodine, steroid and/or numbing medications?  NO    Is patient currently taking antibiotics or have an active infection?  NO    Has pt had a fever/elevated temperature within the past week? NO    Is patient currently taking oral steroids? NO    Does patient have a ? Yes       Is patient pregnant or breastfeeding?  Not Applicable    Are the vital signs normal?  Yes

## 2017-09-20 NOTE — MR AVS SNAPSHOT
After Visit Summary   9/20/2017    Hermelindo Conley    MRN: [id number]           Patient Information     Date Of Birth          1960        Visit Information        Provider Department      9/20/2017 8:45 AM Lary Patel MD [facility]        Care Instructions    [facility]   Cervical Medial Branch Block Discharge Instructions  Nurse line:  [phone]      Your procedure was performed by:  Dr. Patel  Medications used: Lidocaine (anesthetic)         You will need to complete the Pain Scale Log form and return it to us as soon as possible.  Once we have received the form, we will review it and call you to determine the next steps.     The form can be faxed to [phone] or mailed to:   [facility] - [address], [facility] [address], [place] [address]      You may resume your regular activity after the injection.    Be cautious with walking since you may have some unsteadiness following the procedure    Avoid driving for 6 hours. The local anesthetic could slow your reflexes    You may shower, however no swimming or tub baths or hot tubs for 24 hours following your procedure.    Your pain will return after the numbing medications have worn off.  You may use your current pain medications as needed.    You may use anti-inflammatory medications (such as Ibuprofen, Aleve, or Advil) or Tylenol for pain control if necessary.  Some people find it helpful to alternate Ibuprofen and Tylenol every 3 hours for a couple of days.    You may use ice packs 10-15 minutes three to four times a day at the injection site for comfort.    If you experience any of the following, call the pain center line during work hours at [phone] or the on-call after hours physician line is [phone]:  -Fever over 100 degree F  -Swelling, bleeding, redness, drainage, warmth at the injection site  -Progressive weakness or  "numbness of your arm  -Unusual new onset of pain that is not improving            Follow-ups after your visit        Who to contact     If you have questions or need follow up information about today's clinic visit or your schedule please contact [facility] directly at [phone].  Normal or non-critical lab and imaging results will be communicated to you by [employer], letter or phone within 4 business days after the clinic has received the results. If you do not hear from us within 7 days, please contact the clinic through [employer] or phone. If you have a critical or abnormal lab result, we will notify you by phone as soon as possible.  Submit refill requests through [employer] or call your pharmacy and they will forward the refill request to us. Please allow 3 business days for your refill to be completed.          Additional Information About Your Visit        MyChart Information     [employer] lets you send messages to your doctor, view your test results, renew your prescriptions, schedule appointments and more. To sign up, go to [email or web address] . Click on \"Log in\" on the left side of the screen, which will take you to the Welcome page. Then click on \"Sign up Now\" on the right side of the page.     You will be asked to enter the access code listed below, as well as some personal information. Please follow the directions to create your username and password.     Your access code is: [email or web address]  Expires: 2017  8:42 AM     Your access code will  in 90 days. If you need help or a new code, please call your [facility] clinic or [phone].        Care EveryWhere ID     This is your Care EveryWhere ID. This could be used by other organizations to access your [facility] medical records  [id number]         Blood Pressure from Last 3 Encounters:   17 134/89   17 118/74   17 118/70    Weight from Last 3 Encounters:   17 69.4 kg (153 lb)   17 68.9 kg (152 lb) "   05/30/17 68 kg (150 lb)              Today, you had the following     No orders found for display       Primary Care Provider Office Phone # Fax #    Aditi Coronel [phone] [address] [place] [address]        Equal Access to Services     [facility]: Hadii aad ku hadasho Soomaali, waaxda luqadaha, qaybta kaalmada adeegyada, waxay idiin haybgn adegrey guardado ladeangelojavid . So [facility] [phone].    ATENCIÓN: Si habla español, tiene a mo disposición servicios gratuitos de asistencia lingüística. Llame al [phone].    We comply with applicable federal civil rights laws and [place] laws. We do not discriminate on the basis of race, color, national origin, age, disability sex, sexual orientation or gender identity.            Thank you!     Thank you for choosing [facility]  for your care. Our goal is always to provide you with excellent care. Hearing back from our patients is one way we can continue to improve our services. Please take a few minutes to complete the written survey that you may receive in the mail after your visit with us. Thank you!             Your Updated Medication List - Protect others around you: Learn how to safely use, store and throw away your medicines at www.disposemymeds.org.          This list is accurate as of: 9/20/17  8:46 AM.  Always use your most recent med list.                   Brand Name Dispense Instructions for use Diagnosis    sildenafil 20 MG tablet    REVATIO    12 tablet    Take 3 tablets (60 mg) by mouth daily as needed Take 1 tablet (20 mg) by mouth three times daily about 30 minutes before sexual intercourse  Never use with nitroglycerin, terazosin or doxazosin.    Erectile dysfunction, unspecified erectile dysfunction type       traZODone 50 MG tablet    DESYREL    60 tablet    Take 1-2 tablets ( mg) by mouth nightly as needed for sleep    Primary insomnia       * varenicline 0.5 MG X 11 & 1 MG X 42  tablet    CHANTIX STARTING MONTH MYNOR    53 tablet    Take 0.5 mg tab daily for 3 days, then 0.5 mg tab twice daily for 4 days, then 1 mg twice daily.    Tobacco abuse       * varenicline 1 MG tablet    CHANTIX    56 tablet    Take 1 tablet (1 mg) by mouth 2 times daily    Tobacco abuse       * Notice:  This list has 2 medication(s) that are the same as other medications prescribed for you. Read the directions carefully, and ask your doctor or other care provider to review them with you.

## 2017-09-20 NOTE — NURSING NOTE
Discharge Information    IV Discontiued Time:  NA    Amount of Fluid Infused:  NA    Discharge Criteria = When patient returns to baseline or as per MD order    Consciousness:  Pt is fully awake    Circulation:  BP +/- 20% of pre-procedure level    Respiration:  Patient is able to breathe deeply    O2 Sat:  Patient is able to maintain O2 Sat >92% on room air    Activity:  Moves 4 extremities on command    Ambulation:  Patient is able to stand and walk     Dressing:  Clean/dry or No Dressing    Notes:   Discharge instructions and AVS given to patient    Patient meets criteria for discharge?  YES    Admitted to PCU?  No    Responsible adult present to accompany patient home?  Yes    Signature/Title:    Evelyn Bird RN Care Coordinator  [facility]

## 2017-09-20 NOTE — PATIENT INSTRUCTIONS
[facility]   Cervical Medial Branch Block Discharge Instructions  Nurse line:  [phone]      Your procedure was performed by:  Dr. Patel  Medications used: Lidocaine (anesthetic)         You will need to complete the Pain Scale Log form and return it to us as soon as possible.  Once we have received the form, we will review it and call you to determine the next steps.     The form can be faxed to [phone] or mailed to:   [facility] - [address], [address] [id number]      You may resume your regular activity after the injection.    Be cautious with walking since you may have some unsteadiness following the procedure    Avoid driving for 6 hours. The local anesthetic could slow your reflexes    You may shower, however no swimming or tub baths or hot tubs for 24 hours following your procedure.    Your pain will return after the numbing medications have worn off.  You may use your current pain medications as needed.    You may use anti-inflammatory medications (such as Ibuprofen, Aleve, or Advil) or Tylenol for pain control if necessary.  Some people find it helpful to alternate Ibuprofen and Tylenol every 3 hours for a couple of days.    You may use ice packs 10-15 minutes three to four times a day at the injection site for comfort.    If you experience any of the following, call the pain center line during work hours at [phone] or the on-call after hours physician line is [phone]:  -Fever over 100 degree F  -Swelling, bleeding, redness, drainage, warmth at the injection site  -Progressive weakness or numbness of your arm  -Unusual new onset of pain that is not improving

## 2017-09-21 ENCOUNTER — TELEPHONE (OUTPATIENT)
Dept: PALLIATIVE MEDICINE | Facility: CLINIC | Age: 57
End: 2017-09-21

## 2017-09-21 DIAGNOSIS — M47.812 FACET ARTHROPATHY, CERVICAL: Primary | ICD-10-CM

## 2017-09-21 NOTE — TELEPHONE ENCOUNTER
Cervical MBB#2 pain data reviewed. Max relief obtained:     At rest:                    100% relief for 3 hours  Right facet load:        pain score was 0 prior and 0 throughout  Normal activity:       100% relief for 3 hours     Routing to provider to review. Patient indicated would like to proceed with RFA. If ok to proceed please route to KEELEY ORTIZN-RN Care Coordinator  [facility]

## 2017-09-26 NOTE — TELEPHONE ENCOUNTER
Faxed completed PA form and supporting documentation for Right CRFA to [facility]. Right fax confirmation 9/26 at 11:10 am        Kaitlyn ANGELES    [facility]

## 2017-10-06 NOTE — TELEPHONE ENCOUNTER
Left VM for patient to schedule Right CRFA.        Kaitlyn ANGELES    [facility]

## 2017-10-06 NOTE — TELEPHONE ENCOUNTER
PA approved.  Effective date: 9/26/17-9/25/18  PA reference #: [id number]  Pt. notified:   Yes   Pt. informed directly.      Kaitlyn ANGELES    [facility]

## 2017-10-10 NOTE — TELEPHONE ENCOUNTER
Pre-screening Questions for RFA Procedure      Procedure ordered Right sided C-RFA    What insurance are we billing for this procedure?  [employer]    Has patient had this injection before? No  Any chance of pregnancy? Not Applicable   If YES, do NOT schedule and route to RN pool    Is  Needed?: No  Will patient have a ?  Yes   If pt is given sedation meds, no driving for 24 hours.  Is pt taking a cab or transportation service? NO        If so will need to be accompanied by an adult too (friend/family member) in order for IV sedation to be given.      Per [facility] Policy:  Outpatients are to have responsible adult or family member to accompany them at discharge and drive them home. A service providing medically trained drivers or attendants would be acceptable. Public transportation would not be acceptable unless the patient is accompanied by a responsible adult or family member.    Is patient taking any blood thinners (plavix, coumadin, jantoven, warfarin, heparin, pradaxa or dabigatran )? No   If YES, do NOT schedule, and route to RN pool    Is patient taking any aspirin products? No     If more than 325mg/day do NOT schedule and route to RN pool     For CERVICAL procedures, hold all aspirin products for 6 days.     Does the patient have a bleeding or clotting disorder? No     If YES, it it OKAY to schedule AND route to RN pool    **For any patients with platelet count <100, must be forwarded to provider**    Does patient have an active infection or treated for one within the past week? No   If YES, do NOT schedule and route to RN nurse pool     Is patient currently taking any antibiotics?  No    For patients on chronic, preventative, or prophylactic antibiotics, procedures may be scheduled.     For patients on antibiotics for active or recent infection:    Alexandre Leavitt Burton-antibiotic course must have been completed for 4 days    Babita Cedillo-antibiotic course must have been completed  for 7 days    Is patient currently taking any steroid medications? (i.e. Prednisone, Medrol)  No     For patients on steroid medications:    Babita Ozuna, Jorge Schroeder-steroid course must have been completed for 4 days    Dr. Cedillo-steroid course must have been completed for 7 days    Reviewed with patient:  If you are started on any steroids or antibiotics between now and your appointment, you must contact us because it may affect our ability to perform your procedure.  Yes    Is patient actively being treated for cancer or immunocompromised, including the spleen having been removed? No  If YES, do NOT schedule and route to RN pool     Any history of complications with sedation medications?  NO   If YES, OK to schedule AND route to RN pool     Any history of sleep apnea?  NO   If YES, OK to schedule AND route to RN pool     Any cardiac history?  NO   If YES, OK to schedule AND route to RN pool     Do you have an implanted pacemaker, ICD (implanted cardiac device) or AICD (automatic implanted cardiac device)?  NO    If YES, do NOT schedule AND route to RN pool.     Obtain name of device :       Obtain name of cardiologist:       Do you have an implanted stimulator?  No    If YES, OK to schedule AND route to nursing.     Instruct patient to bring in the remote to the appointment and it will need to be turned off.  reviewed      Does patient have an allergy to contrast dye, iodine or shellfish?  No   If YES, OK to schedule. Route to RN pool AND add allergy information to appointment notes    Are you able to get on and off an exam table with minimal or no assistance? Yes   If NO, do NOT schedule and route to RN pool    Are you able to roll over and lay on your stomach with minimal or no assistance? Yes   If NO, do NOT schedule and route to RN pool    Informed patient that s/he needs to be NPO for 6 hours before procedure?  YES   Informed patient that it is OK to take normal medications with sips of  water, especially blood pressure medications, before the procedure and must hold blood thinners as instructed.  Yes  Informed patient to arrive 30 minutes before procedure time to have an IV inserted.  reviewed   Do NOT schedule at 0745, 0815 or 1245.  reviewed   All radiofrequency ablations are in a 90 minute time slot except genicular radiofrequency ablation those are 60 minute time slot.  reviewed     When you schedule an RFA for [place], send an e-mail to Ez Vides ([email or web address]) with the type of RFA (cervical, lumbar, etc), provider, scheduled date and time of the procedure, and location (ie. [facility]. Also Cc LIA Boss RN and the provider. OK    In [place] there are only 6 probes for each area (lumbar and cervical) with a 72 hour autoclave.  Genicular and TON RFA s use the cervical probes.  Make sure that there are at least 3 days between LRFA s and 3 days between procedures needing cervical probes. MANASA NAIPER    [facility]

## 2017-11-15 NOTE — PROGRESS NOTES
[facility] - Procedure Note    Date of Visit: 11/16/2017    Pre procedure Diagnosis: Right Cervical facet arthropathy   Post procedure Diagnosis: Same  Procedure performed: Right C4,5,6  radiofrequency ablation   Anesthesia: moderate sedation with 2 mg versed & 100mcg fentanyl  Complications: NONE  Operators: Lary Patel MD & Linda Gomez MD (pain fellow)     Indications:   Hermelindo Conley is a 57 year old male was sent by Dr. Mykel De La Rosa DO for cervical RFA.  They have a history of right sided neck pain radiating to the right shoulder which is worse with cervical extension/rotation. Exam shows increased discomfort with extension and rotation or the cervical spine and they have tried conservative treatment including medications, PT and cervical BENITA's.    Physical Exam:   Resp: CTA bilaterally  CV: RRR no murmurs, rubs or gallops  Airway:  Mallampati Class I    Hermelindo Conley had medial branch blocks on 9/20/2017 and 8/16/2017 showing appropriate pain relief.    Options/alternatives, benefits and risks were discussed with the patient including bleeding, infection, no pain relief, tissue trauma, exposure to radiation, reaction to medications including seizure, spinal cord injury,increased pain after the procedure, weakness, numbness or sensory changes and headache.   We also discussed risks of sedation, including reaction to medications and cardiovascular collapse.    Questions were answered to his satisfaction and he agrees to proceed. Voluntary informed consent was obtained and signed.     Vitals were reviewed: Yes, /81 (BP Location: Left arm, Patient Position: Sitting, Cuff Size: Adult Regular)  Pulse 73  Resp 10  SpO2 97%  Allergies were reviewed:  Yes   Medications were reviewed:  Yes   Pre-procedure pain score: 6/10    MRI was done on 5/24/2017 which showed   FINDINGS: There is normal alignment of the cervical vertebrae.  Vertebral body heights of the cervical spine are normal.  Marrow signal  throughout the cervical vertebrae is within normal limits. There is no  evidence for fracture or pathologic bony lesion of the cervical spine.       There is loss of disc height, disc desiccation and posterior disc  bulging to varying degrees at all levels of the cervical spine.        The cervical spinal cord is normal in contour. There is no abnormal  signal in the cervical spinal cord. There is no evidence for  intrathecal abnormality.      Level by level:      C2-C3: There is facet arthropathy bilaterally, uncinate hypertrophy  bilaterally and a posterior broad-based disc-osteophyte complex. These  findings result in mild left neural foraminal narrowing but no spinal  canal or right foraminal stenosis.      C3-C4: There is facet arthropathy bilaterally, uncinate hypertrophy  bilaterally and a posterior broad-based disc-osteophyte complex with a  superimposed tiny posterior central disc herniation (protrusion).  These findings result in moderate left foraminal stenosis and mild  right foraminal narrowing but no spinal canal stenosis.      C4-C5: There is facet arthropathy bilaterally, uncinate hypertrophy  bilaterally and a posterior broad-based disc-osteophyte complex with a  superimposed tiny posterior central disc herniation (protrusion).  These findings result in mild bilateral neural foraminal narrowing and  minimal spinal canal narrowing.      C5-C6: There is facet arthropathy bilaterally, uncinate hypertrophy  bilaterally and a posterior broad-based disc-osteophyte complex with a  superimposed small irregularly-shaped posterior broad-based disc  herniation (protrusion). These findings result in mild-moderate left  foraminal narrowing, moderate right foraminal stenosis and mild spinal  canal narrowing.      C6-C7: There is facet arthropathy bilaterally, uncinate hypertrophy  bilaterally and a posterior broad-based disc-osteophyte complex. These  findings result in mild-moderate left foraminal  narrowing and mild  right foraminal narrowing as well as minimal spinal canal narrowing.      C7-T1: There is facet arthropathy bilaterally, uncinate hypertrophy  bilaterally and a posterior broad-based disc-osteophyte complex. There  is no spinal canal or neural foraminal stenosis at this level.          IMPRESSION: Diffuse degenerative changes of the cervical spine as  described above.    Allergies:    No Known Allergies     Vitals:  /81 (BP Location: Left arm, Patient Position: Sitting, Cuff Size: Adult Regular)  Pulse 73  Resp 10  SpO2 97%    Review of Systems: The patient denies recent fever, chills, illness, use of antibiotics or anticoagulants. All other 10-point review of systems negative.     Procedure:  After getting informed consent, patient was brought into the procedure suite and was placed in a prone position on the procedure table.   A Pause for the Cause was performed.  Patient was prepped and draped in sterile fashion.     Hermelindo Conley had an IV line placed prior the procedure.  The C-arm was positioned in the AP view to afford optimal view of right C4,5,6 lateral articular pillars. Lidocaine 1% was used to anesthetize the skin at each level.  A 100mm, 20 gauge curved tip RF needle with 10 mm active tip was initially advanced towards the articular pillar of C6. The needle was advanced slowly until it had a contact on superior articular pillar. Then under lateral fluoroscopic guidance the needle was adjusted so it stayed mid portion of the AP dimention of the articular pillar. Then, the two other RF needles of the same size and gauge were placed under fluorsoscopic guidance. The second needle was placed at the articular pillar of C5 and the third needle was placed at C4 using the same technique.  The needle placements were confirmed with AP and lateral fluoroscopic views, and they appeared in good position.   Each position was tested for motor and sensory stimulation, and was positioned so  that stimulation was negative for stimuli outside the immediate area of the desired lesion.  Sensory stimulation was completed at 50 Hz, with max stimulation up to 0.8V.  Motor stimulation was completed at 2Hz, up to 2V.    Lidocaine 2% 0.5 ml was injected, and a 90 second, 80 degree Centigrade lesion was generated.  The needles were rotated 180 degrees and another 90 second, 80 degree Centigrade lesion was generated.     Needles were removed, hemostasis was achieved.    The needles were withdrawn. Hemostasis was achieved, the area was cleaned, and bandaids were placed when appropriate.  The patient tolerated the procedure well, and was taken to the recovery room.    Images were saved to PACS.    Start sedation time: 1340  End sedation time: 1432    Post-procedure pain score: 0/10  Follow-up includes:   -f/u phone call in one week  -f/u with Dr. Patel in 2 weeks

## 2017-11-16 ENCOUNTER — RADIOLOGY INJECTION OFFICE VISIT (OUTPATIENT)
Dept: PALLIATIVE MEDICINE | Facility: CLINIC | Age: 57
End: 2017-11-16
Payer: COMMERCIAL

## 2017-11-16 ENCOUNTER — RADIANT APPOINTMENT (OUTPATIENT)
Dept: GENERAL RADIOLOGY | Facility: CLINIC | Age: 57
End: 2017-11-16
Attending: ANESTHESIOLOGY

## 2017-11-16 VITALS
HEART RATE: 73 BPM | DIASTOLIC BLOOD PRESSURE: 81 MMHG | RESPIRATION RATE: 10 BRPM | OXYGEN SATURATION: 97 % | SYSTOLIC BLOOD PRESSURE: 122 MMHG

## 2017-11-16 DIAGNOSIS — M47.812 FACET ARTHROPATHY, CERVICAL: ICD-10-CM

## 2017-11-16 DIAGNOSIS — M47.812 FACET ARTHROPATHY, CERVICAL: Primary | ICD-10-CM

## 2017-11-16 PROCEDURE — 64634 DESTROY C/TH FACET JNT ADDL: CPT | Mod: RT | Performed by: ANESTHESIOLOGY

## 2017-11-16 PROCEDURE — 64633 DESTROY CERV/THOR FACET JNT: CPT | Mod: RT | Performed by: ANESTHESIOLOGY

## 2017-11-16 NOTE — MR AVS SNAPSHOT
After Visit Summary   11/16/2017    Hermelindo Conley    MRN: [id number]           Patient Information     Date Of Birth          1960        Visit Information        Provider Department      11/16/2017 1:15 PM Lary Patel MD [facility]        Care Instructions    [facility] Procedure Discharge Instructions       Today you saw:   Dr. Lary Patel        Your procedure:  Radiofrequency Nerve Ablation     Procedural Medications:  Lidocaine (anesthetic)     Sedation medications:  Fentanyl - pain.     Versed - relaxation      You have received sedation during your procedure; for the next 24 hours you should not:   -Drive   -Operate machinery   -Drink alcohol   -Sign any legal documents     You may resume your normal diet and medications.   Avoid strenuous activity for the first 24 hours and resume regular activities after that.   Be cautious with walking as numbness and/or weakness in the lower extremities up to 6-8 hours may occur due to effect of local anesthetic   You may shower, however no swimming or tub baths or hot tubs for 24 hours following your procedure   Anticipate pain for up to 2 weeks after this procedure.  You may use ice packs 10-15 minutes three to four times a day at the injection site for comfort   You may use anti-inflammatory medications (such as Ibuprofen or Aleve or Advil) or Tylenol for pain control if necessary   It may take up to 4-6 weeks to receive relief from the radiofrequency ablation .  Follow up with your provider during that 4-6 week interval    If you experience any of the following, call the pain center line during work hours at [phone] or on call physician after hours at [phone]:  -Fever over 100 degree F   -Swelling, bleeding, redness, drainage, warmth at the injection site   -Progressive weakness or numbness in your legs or arms   -Loss of bowel or bladder function   -Unusual headache that is not relieved by Tylenol  "  -Unusual new onset of pain that is not improving    Nurse triage line for general questions: [phone]                  Follow-ups after your visit        Who to contact     If you have questions or need follow up information about today's clinic visit or your schedule please contact [facility] directly at [phone].  Normal or non-critical lab and imaging results will be communicated to you by MyChart, letter or phone within 4 business days after the clinic has received the results. If you do not hear from us within 7 days, please contact the clinic through MediaPlatformhart or phone. If you have a critical or abnormal lab result, we will notify you by phone as soon as possible.  Submit refill requests through [employer] or call your pharmacy and they will forward the refill request to us. Please allow 3 business days for your refill to be completed.          Additional Information About Your Visit        MediaPlatformhart Information     [employer] lets you send messages to your doctor, view your test results, renew your prescriptions, schedule appointments and more. To sign up, go to [email or web address] . Click on \"Log in\" on the left side of the screen, which will take you to the Welcome page. Then click on \"Sign up Now\" on the right side of the page.     You will be asked to enter the access code listed below, as well as some personal information. Please follow the directions to create your username and password.     Your access code is: [email or web address]  Expires: 2018  1:20 PM     Your access code will  in 90 days. If you need help or a new code, please call your [facility] clinic or [phone].        Care EveryWhere ID     This is your Care EveryWhere ID. This could be used by other organizations to access your [facility] medical records  [id number]        Your Vitals Were     Pulse Pulse Oximetry                66 95%           Blood Pressure from Last 3 Encounters:   17 118/72   17 " 114/76   08/16/17 134/89    Weight from Last 3 Encounters:   07/03/17 69.4 kg (153 lb)   06/19/17 68.9 kg (152 lb)   05/30/17 68 kg (150 lb)              Today, you had the following     No orders found for display       Primary Care Provider Office Phone # Fax #    Aditi Coronel [phone] [address] [place] [address]        Equal Access to Services     [facility]: Hadii aad ku hadasho Soomaali, waaxda luqadaha, qaybta kaalmada adeegyada, waxay idiin hayaan adeeg kharash la'bgn . So [facility] [phone].    ATENCIÓN: Si habla español, tiene a mo disposición servicios gratuitos de asistencia lingüística. [facility] [phone].    We comply with applicable federal civil rights laws and [place] laws. We do not discriminate on the basis of race, color, national origin, age, disability, sex, sexual orientation, or gender identity.            Thank you!     Thank you for choosing [facility]  for your care. Our goal is always to provide you with excellent care. Hearing back from our patients is one way we can continue to improve our services. Please take a few minutes to complete the written survey that you may receive in the mail after your visit with us. Thank you!             Your Updated Medication List - Protect others around you: Learn how to safely use, store and throw away your medicines at www.disposemymeds.org.          This list is accurate as of: 11/16/17  1:20 PM.  Always use your most recent med list.                   Brand Name Dispense Instructions for use Diagnosis    sildenafil 20 MG tablet    REVATIO    12 tablet    Take 3 tablets (60 mg) by mouth daily as needed Take 1 tablet (20 mg) by mouth three times daily about 30 minutes before sexual intercourse  Never use with nitroglycerin, terazosin or doxazosin.    Erectile dysfunction, unspecified erectile dysfunction type       traZODone 50 MG tablet    DESYREL    60 tablet    Take 1-2  tablets ( mg) by mouth nightly as needed for sleep    Primary insomnia       * varenicline 0.5 MG X 11 & 1 MG X 42 tablet    CHANTIX STARTING MONTH MYNOR    53 tablet    Take 0.5 mg tab daily for 3 days, then 0.5 mg tab twice daily for 4 days, then 1 mg twice daily.    Tobacco abuse       * varenicline 1 MG tablet    CHANTIX    56 tablet    Take 1 tablet (1 mg) by mouth 2 times daily    Tobacco abuse       * Notice:  This list has 2 medication(s) that are the same as other medications prescribed for you. Read the directions carefully, and ask your doctor or other care provider to review them with you.

## 2017-11-16 NOTE — NURSING NOTE
Discharge Information    IV Discontiued Time:  1430     Amount of Fluid Infused:  100    Discharge Criteria = When patient returns to baseline or as per MD order    Consciousness:  Pt is fully awake    Circulation:  BP +/- 20% of pre-procedure level    Respiration:  Patient is able to breathe deeply    O2 Sat:  Patient is able to maintain O2 Sat >92% on room air    Activity:  Moves 4 extremities on command    Ambulation:  Patient is able to stand and walk or stand and pivot into wheelchair    Dressing:  Clean/dry or No Dressing    Notes:   Discharge instructions and AVS given to patient    Patient meets criteria for discharge?  YES    Admitted to PCU?  No    Responsible adult present to accompany patient home?  Yes    Signature/Title:    Deysi Ladd RN Care Coordinator  [facility] [place]

## 2017-11-16 NOTE — NURSING NOTE
22g PIV to left forearm      MD Time IN: 1335   Sedation start time:  1340    MD Time OUT:  1415    Medications given: fentanyl 100 mcg IV; versed 2 mg IV  Intravenous fluids were administered, normal saline 100 cc's.  Sedation Level Achieved:  Minimal sedation

## 2017-11-16 NOTE — PATIENT INSTRUCTIONS
[facility] Procedure Discharge Instructions       Today you saw:   Dr. Lary Patel        Your procedure:  Radiofrequency Nerve Ablation     Procedural Medications:  Lidocaine (anesthetic)     Sedation medications:  Fentanyl - pain.     Versed - relaxation      You have received sedation during your procedure; for the next 24 hours you should not:   -Drive   -Operate machinery   -Drink alcohol   -Sign any legal documents     You may resume your normal diet and medications.   Avoid strenuous activity for the first 24 hours and resume regular activities after that.   Be cautious with walking as numbness and/or weakness in the lower extremities up to 6-8 hours may occur due to effect of local anesthetic   You may shower, however no swimming or tub baths or hot tubs for 24 hours following your procedure   Anticipate pain for up to 2 weeks after this procedure.  You may use ice packs 10-15 minutes three to four times a day at the injection site for comfort   You may use anti-inflammatory medications (such as Ibuprofen or Aleve or Advil) or Tylenol for pain control if necessary   It may take up to 4-6 weeks to receive relief from the radiofrequency ablation .  Follow up with your provider during that 4-6 week interval    If you experience any of the following, call the pain center line during work hours at [phone] or on call physician after hours at [phone]:  -Fever over 100 degree F   -Swelling, bleeding, redness, drainage, warmth at the injection site   -Progressive weakness or numbness in your legs or arms   -Loss of bowel or bladder function   -Unusual headache that is not relieved by Tylenol   -Unusual new onset of pain that is not improving    Nurse triage line for general questions: [phone]

## 2017-11-16 NOTE — NURSING NOTE
Pre-procedure Intake    Have you been fasting? Yes    If yes, for how long? 12hrs    Are you taking a prescribed blood thinner such as coumadin, Plavix, Xarelto?    No    If yes, when did you take your last dose?     Do you take aspirin?  No    If cervical procedure, have you held aspirin for 6 days?   NA    Do you have any allergies to contrast dye, iodine, steroid and/or numbing medications?  NO    Are you currently taking antibiotics or have an active infection?  NO    Have you had a fever/elevated temperature within the past week? NO    Are you currently taking oral steroids? NO    Do you have a ? Yes       Are you pregnant or breastfeeding?  Not Applicable    Are the vital signs normal?  Yes

## 2018-01-22 PROBLEM — M25.511 PAIN IN JOINT OF RIGHT SHOULDER: Status: RESOLVED | Noted: 2017-04-17 | Resolved: 2018-01-22

## 2018-01-22 PROBLEM — M47.812 CERVICAL SPINE DEGENERATION: Status: RESOLVED | Noted: 2017-04-17 | Resolved: 2018-01-22

## 2018-08-30 ENCOUNTER — OFFICE VISIT (OUTPATIENT)
Dept: PEDIATRICS | Facility: CLINIC | Age: 58
End: 2018-08-30
Payer: COMMERCIAL

## 2018-08-30 VITALS
HEART RATE: 66 BPM | DIASTOLIC BLOOD PRESSURE: 60 MMHG | SYSTOLIC BLOOD PRESSURE: 112 MMHG | HEIGHT: 69 IN | TEMPERATURE: 96.5 F | WEIGHT: 144.8 LBS | BODY MASS INDEX: 21.45 KG/M2 | OXYGEN SATURATION: 99 %

## 2018-08-30 DIAGNOSIS — R35.0 URINARY FREQUENCY: ICD-10-CM

## 2018-08-30 DIAGNOSIS — L84 CORN OR CALLUS: ICD-10-CM

## 2018-08-30 DIAGNOSIS — H61.22 IMPACTED CERUMEN OF LEFT EAR: Primary | ICD-10-CM

## 2018-08-30 LAB
ALBUMIN UR-MCNC: NEGATIVE MG/DL
APPEARANCE UR: CLEAR
BACTERIA #/AREA URNS HPF: ABNORMAL /HPF
BILIRUB UR QL STRIP: NEGATIVE
COLOR UR AUTO: YELLOW
GLUCOSE UR STRIP-MCNC: NEGATIVE MG/DL
HGB UR QL STRIP: ABNORMAL
KETONES UR STRIP-MCNC: NEGATIVE MG/DL
LEUKOCYTE ESTERASE UR QL STRIP: NEGATIVE
NITRATE UR QL: NEGATIVE
PH UR STRIP: 7 PH (ref 5–7)
RBC #/AREA URNS AUTO: ABNORMAL /HPF
SOURCE: ABNORMAL
SP GR UR STRIP: 1.01 (ref 1–1.03)
UROBILINOGEN UR STRIP-ACNC: 0.2 EU/DL (ref 0.2–1)
WBC #/AREA URNS AUTO: ABNORMAL /HPF

## 2018-08-30 PROCEDURE — 99214 OFFICE O/P EST MOD 30 MIN: CPT | Performed by: INTERNAL MEDICINE

## 2018-08-30 PROCEDURE — 81001 URINALYSIS AUTO W/SCOPE: CPT | Performed by: INTERNAL MEDICINE

## 2018-08-30 NOTE — PROGRESS NOTES
SUBJECTIVE:   Hermelindo Conley is a 58 year old male who presents to clinic today for the following health issues:    Genitourinary symptoms      Duration: 2 weeks    Description:  frequency, nocturia x 2-4, hesitancy and retention    Intensity:  moderate, severe    Accompanying signs and symptoms (fever/discharge/nausea/vomiting/back or abdominal pain):  None    History (frequent UTI's/kidney stones/prostate problems): None  Sexually active: no     Precipitating or alleviating factors: None    Therapies tried and outcome: none   Outcome:     Urine stream is starting and stopping a lot. Feels like not draining fully. Will urinate and thinks is done and has to go back in. Having increased frequency. Getting up 3-4 times at night. Sometimes feels like has to go and doesn't. No dysuria. Noticed more over the last 2 weeks. No family history of prostate cancer.    Loss of hearing left ear      Duration: 10 days    Description (location/character/radiation): ears popped due to elevation but not able to hear left ear    Intensity:  severe    Accompanying signs and symptoms: excessive cerumen, buzzing sound    History (similar episodes/previous evaluation): None    Precipitating or alleviating factors: [place]    Therapies tried and outcome: None    A week ago Monday, was driving to [facility] and with change of elevation right ear popped, but left never has popped. Getting big globs of wax out of the ear. No pain. Getting buzzing sound and has decreased hearing. No problems in the past.      Corn/wart right 5th toe - has had for 2-3 weeks. Painful. Tried multiple different shoes and none seem to help. Mostly wears tennis shoes with a fairly wide toe box.    Reviewed and updated as needed this visit by clinical staff  Tobacco  Allergies  Meds  Problems  Med Hx  Surg Hx  Fam Hx  Soc Hx        Reviewed and updated as needed this visit by Provider  Allergies  Meds  Problems      "  -------------------------------------    ROS:  Constitutional, HEENT, cardiovascular, pulmonary, gi and gu systems are negative, except as otherwise noted.    OBJECTIVE:                                                    /60 (BP Location: Right arm, Patient Position: Sitting, Cuff Size: Adult Regular)  Pulse 66  Temp 96.5  F (35.8  C) (Tympanic)  Ht 5' 9\" (1.753 m)  Wt 144 lb 12.8 oz (65.7 kg)  SpO2 99%  BMI 21.38 kg/m2   Body mass index is 21.38 kg/(m^2).  General Appearance: healthy, alert and no distress  Eyes:   no discharge, erythema.  Normal pupils.  Right Ear: occluded with wax, TM appears normal after washed out  Left Ear: normal: no effusions, no erythema, normal landmarks  Neck: Supple.  No adenopathy, no asymmetry, masses  Respiratory: lungs clear to auscultation - no rales, rhonchi or wheezes.  Cardiovascular: regular rate and rhythm, normal S1 S2, no S3 or S4 and no murmur, click or rub.  No peripheral edema.  Skin: large corn over dorsal 5th toe on right side. no rashes or lesions.  Well perfused and normal turgor.    Diagnostic Test Results:  Results for orders placed or performed in visit on 08/30/18   *UA reflex to Microscopic and Culture ([place] and [facility] (except [place] and [place])   Result Value Ref Range    Color Urine Yellow     Appearance Urine Clear     Glucose Urine Negative NEG^Negative mg/dL    Bilirubin Urine Negative NEG^Negative    Ketones Urine Negative NEG^Negative mg/dL    Specific Gravity Urine 1.010 1.003 - 1.035    Blood Urine Trace (A) NEG^Negative    pH Urine 7.0 5.0 - 7.0 pH    Protein Albumin Urine Negative NEG^Negative mg/dL    Urobilinogen Urine 0.2 0.2 - 1.0 EU/dL    Nitrite Urine Negative NEG^Negative    Leukocyte Esterase Urine Negative NEG^Negative    Source Midstream Urine    Urine Microscopic   Result Value Ref Range    WBC Urine 0 - 5 OTO5^0 - 5 /HPF    RBC Urine O - 2 OTO2^O - 2 /HPF    Bacteria Urine Few (A) NEG^Negative /HPF "     Procedure Note:  Corn was cleaned off with alcohol pad. It was trimmed using a 15 blade scalpel. No bleeding or complications. Some remaining thickening present, but did not feel comfortable going deeper with underlying bone close by.       ASSESSMENT/PLAN:                                                      (H61.22) Impacted cerumen of left ear  (primary encounter diagnosis)  Comment: washed out in clinic  Plan: symptoms improved after washing out    (R35.0) Urinary frequency  Comment: UA negative. Likely due to enlarged prostate.   Plan: *UA reflex to Microscopic and Culture ([place]         and [facility] (except [place] and         [place]), Urine Microscopic  - declined prostate exam today  - scheduled physical in 2 weeks - will complete at that time  - will also obtain a PSA at that time  - briefly discussed treatment options for BPH    (L84) Corn or callus  Comment: trimmed in clinic. Some remaining callous that was unable to be removed  Plan: TRIM HYPERKERATOTIC SKIN LESION, ONE  - discussed wearing well fitting shoes  - corns pads after soaking foot for 10-15 minutes    FUTURE APPOINTMENTS:       - Follow-up visit in 2 weeks for physical and fasting labs. Will need order for colonoscopy.     Aditi [facility]

## 2018-08-30 NOTE — PATIENT INSTRUCTIONS
1. Urine looks ok  2. Debrox solution if ear gets clogged again  3. Follow-up in 2 weeks for physical - fast 10-12 hours, water and black coffee are ok

## 2018-08-30 NOTE — MR AVS SNAPSHOT
"              After Visit Summary   8/30/2018    Hermelindo Conley    MRN: [id number]           Patient Information     Date Of Birth          1960        Visit Information        Provider Department      8/30/2018 10:20 AM Aditi Coronel MD [facility]        Today's Diagnoses     Urinary frequency    -  1    Corn or callus          Care Instructions    1. Urine looks ok  2. Debrox solution if ear gets clogged again  3. Follow-up in 2 weeks for physical - fast 10-12 hours, water and black coffee are ok          Follow-ups after your visit        Follow-up notes from your care team     Return in about 2 weeks (around 9/13/2018) for Physical Exam.      Your next 10 appointments already scheduled     Sep 13, 2018  8:20 AM CDT   SHORT with Aditi Coronel MD   [facility] ([facility]    [address] [place] [address]  [address]  [facility] [id number] [phone]              Who to contact     If you have questions or need follow up information about today's clinic visit or your schedule please contact [facility] directly at [phone].  Normal or non-critical lab and imaging results will be communicated to you by ilabhart, letter or phone within 4 business days after the clinic has received the results. If you do not hear from us within 7 days, please contact the clinic through ilabhart or phone. If you have a critical or abnormal lab result, we will notify you by phone as soon as possible.  Submit refill requests through [email or web address] or call your pharmacy and they will forward the refill request to us. Please allow 3 business days for your refill to be completed.          Additional Information About Your Visit        ilabhart Information     [email or web address] lets you send messages to your doctor, view your test results, renew your prescriptions, schedule appointments and more. To sign up, go to [email or web address] . Click on \"Log in\" on the left side of the " "screen, which will take you to the Welcome page. Then click on \"Sign up Now\" on the right side of the page.     You will be asked to enter the access code listed below, as well as some personal information. Please follow the directions to create your username and password.     Your access code is: [email or web address]  Expires: 2018 11:33 AM     Your access code will  in 90 days. If you need help or a new code, please call your [facility] clinic or [phone].        Care EveryWhere ID     This is your Care EveryWhere ID. This could be used by other organizations to access your [facility] medical records  [id number]        Your Vitals Were     Pulse Temperature Height Pulse Oximetry BMI (Body Mass Index)       66 96.5  F (35.8  C) (Tympanic) 5' 9\" (1.753 m) 99% 21.38 kg/m2        Blood Pressure from Last 3 Encounters:   18 112/60   17 122/81   17 114/76    Weight from Last 3 Encounters:   18 144 lb 12.8 oz (65.7 kg)   17 153 lb (69.4 kg)   17 152 lb (68.9 kg)              We Performed the Following     *UA reflex to Microscopic and Culture ([place] and [facility] (except [place] and [place])     Urine Microscopic        Primary Care Provider Office Phone # Fax #    Aditi Coronel [phone] [address] [place] [address]        Equal Access to Services     [facility]: Hadii aad ku hadasho Soomaali, waaxda luqadaha, qaybta kaalmada adeegyada, rachael macias . So [facility] [phone].    ATENCIÓN: Si habla español, tiene a mo disposición servicios gratuitos de asistencia lingüística. Llame al [phone].    We comply with applicable federal civil rights laws and [place] laws. We do not discriminate on the basis of race, color, national origin, age, disability, sex, sexual orientation, or gender identity.            Thank you!     Thank you for choosing [facility]  for your care. Our " goal is always to provide you with excellent care. Hearing back from our patients is one way we can continue to improve our services. Please take a few minutes to complete the written survey that you may receive in the mail after your visit with us. Thank you!             Your Updated Medication List - Protect others around you: Learn how to safely use, store and throw away your medicines at www.disposemymeds.org.          This list is accurate as of 8/30/18 11:33 AM.  Always use your most recent med list.                   Brand Name Dispense Instructions for use Diagnosis    traZODone 50 MG tablet    DESYREL    60 tablet    Take 1-2 tablets ( mg) by mouth nightly as needed for sleep    Primary insomnia

## 2018-09-13 ENCOUNTER — OFFICE VISIT (OUTPATIENT)
Dept: PEDIATRICS | Facility: CLINIC | Age: 58
End: 2018-09-13
Payer: COMMERCIAL

## 2018-09-13 VITALS
WEIGHT: 144.7 LBS | BODY MASS INDEX: 21.43 KG/M2 | HEART RATE: 72 BPM | DIASTOLIC BLOOD PRESSURE: 64 MMHG | HEIGHT: 69 IN | SYSTOLIC BLOOD PRESSURE: 112 MMHG | TEMPERATURE: 96.5 F | OXYGEN SATURATION: 98 %

## 2018-09-13 DIAGNOSIS — Z11.59 NEED FOR HEPATITIS C SCREENING TEST: ICD-10-CM

## 2018-09-13 DIAGNOSIS — L84 CORN OR CALLUS: ICD-10-CM

## 2018-09-13 DIAGNOSIS — Z13.1 SCREENING FOR DIABETES MELLITUS: ICD-10-CM

## 2018-09-13 DIAGNOSIS — F51.01 PRIMARY INSOMNIA: ICD-10-CM

## 2018-09-13 DIAGNOSIS — R35.0 URINARY FREQUENCY: ICD-10-CM

## 2018-09-13 DIAGNOSIS — N40.1 BENIGN PROSTATIC HYPERPLASIA WITH URINARY FREQUENCY: ICD-10-CM

## 2018-09-13 DIAGNOSIS — Z12.11 SCREEN FOR COLON CANCER: ICD-10-CM

## 2018-09-13 DIAGNOSIS — Z00.00 ROUTINE GENERAL MEDICAL EXAMINATION AT A HEALTH CARE FACILITY: Primary | ICD-10-CM

## 2018-09-13 DIAGNOSIS — R35.0 BENIGN PROSTATIC HYPERPLASIA WITH URINARY FREQUENCY: ICD-10-CM

## 2018-09-13 DIAGNOSIS — Z12.5 SCREENING FOR PROSTATE CANCER: ICD-10-CM

## 2018-09-13 DIAGNOSIS — Z13.220 SCREENING CHOLESTEROL LEVEL: ICD-10-CM

## 2018-09-13 LAB
ALBUMIN SERPL-MCNC: 4.1 G/DL (ref 3.4–5)
ALP SERPL-CCNC: 61 U/L (ref 40–150)
ALT SERPL W P-5'-P-CCNC: 23 U/L (ref 0–70)
ANION GAP SERPL CALCULATED.3IONS-SCNC: 6 MMOL/L (ref 3–14)
AST SERPL W P-5'-P-CCNC: 11 U/L (ref 0–45)
BILIRUB SERPL-MCNC: 0.4 MG/DL (ref 0.2–1.3)
BUN SERPL-MCNC: 11 MG/DL (ref 7–30)
CALCIUM SERPL-MCNC: 8.8 MG/DL (ref 8.5–10.1)
CHLORIDE SERPL-SCNC: 107 MMOL/L (ref 94–109)
CHOLEST SERPL-MCNC: 145 MG/DL
CO2 SERPL-SCNC: 30 MMOL/L (ref 20–32)
CREAT SERPL-MCNC: 0.59 MG/DL (ref 0.66–1.25)
GFR SERPL CREATININE-BSD FRML MDRD: >90 ML/MIN/1.7M2
GLUCOSE SERPL-MCNC: 97 MG/DL (ref 70–99)
HCV AB SERPL QL IA: NONREACTIVE
HDLC SERPL-MCNC: 46 MG/DL
LDLC SERPL CALC-MCNC: 92 MG/DL
NONHDLC SERPL-MCNC: 99 MG/DL
POTASSIUM SERPL-SCNC: 4.6 MMOL/L (ref 3.4–5.3)
PROT SERPL-MCNC: 7.1 G/DL (ref 6.8–8.8)
PSA SERPL-ACNC: 0.62 UG/L (ref 0–4)
SODIUM SERPL-SCNC: 143 MMOL/L (ref 133–144)
TRIGL SERPL-MCNC: 34 MG/DL

## 2018-09-13 PROCEDURE — 86803 HEPATITIS C AB TEST: CPT | Performed by: INTERNAL MEDICINE

## 2018-09-13 PROCEDURE — 36415 COLL VENOUS BLD VENIPUNCTURE: CPT | Performed by: INTERNAL MEDICINE

## 2018-09-13 PROCEDURE — 99213 OFFICE O/P EST LOW 20 MIN: CPT | Mod: 25 | Performed by: INTERNAL MEDICINE

## 2018-09-13 PROCEDURE — 80061 LIPID PANEL: CPT | Performed by: INTERNAL MEDICINE

## 2018-09-13 PROCEDURE — G0103 PSA SCREENING: HCPCS | Performed by: INTERNAL MEDICINE

## 2018-09-13 PROCEDURE — 99396 PREV VISIT EST AGE 40-64: CPT | Performed by: INTERNAL MEDICINE

## 2018-09-13 PROCEDURE — 80053 COMPREHEN METABOLIC PANEL: CPT | Performed by: INTERNAL MEDICINE

## 2018-09-13 RX ORDER — TRAZODONE HYDROCHLORIDE 50 MG/1
50 TABLET, FILM COATED ORAL
Qty: 60 TABLET | Refills: 4 | Status: SHIPPED | OUTPATIENT
Start: 2018-09-13 | End: 2019-10-16

## 2018-09-13 RX ORDER — TAMSULOSIN HYDROCHLORIDE 0.4 MG/1
0.4 CAPSULE ORAL DAILY
Qty: 30 CAPSULE | Refills: 11 | Status: SHIPPED | OUTPATIENT
Start: 2018-09-13 | End: 2020-01-29

## 2018-09-13 ASSESSMENT — ENCOUNTER SYMPTOMS
CONSTIPATION: 0
DIARRHEA: 0
HEMATURIA: 0
SHORTNESS OF BREATH: 0
ABDOMINAL PAIN: 0
DYSURIA: 0
ROS SKIN COMMENTS: CORN
FREQUENCY: 1
COUGH: 0

## 2018-09-13 NOTE — LETTER
[facility] [place]  [address] [address]                  [phone]   September 14, 2018    Hermelindo Conley  [address]  [facility] [address]      Dear Hermelindo,    Here is a summary of your recent test results:    1. Your hepatitis C screening test is negative.    2. Your cholesterol looks great! Your total cholesterol is 145 (normal is 200 or less). Your LDL or bad cholesterol is 92 (normal is less than 100). Your HDL or good cholesterol is 46 (normal is 40 or higher). I recommend rechecking your cholesterol in 5 years.    3. Your electrolytes, liver function, kidney function, diabetes screen and prostate cancer screening test are all normal.    Please let me know if you have any questions. Great to see you!    Your test results are enclosed.               Thank you very much for choosing [facility]    Best regards,    Aditi Coronel MD        Results for orders placed or performed in visit on 09/13/18   Hepatitis C Screen Reflex to HCV RNA Quant and Genotype   Result Value Ref Range    Hepatitis C Antibody Nonreactive NR^Nonreactive   Lipid panel reflex to direct LDL Fasting   Result Value Ref Range    Cholesterol 145 <200 mg/dL    Triglycerides 34 <150 mg/dL    HDL Cholesterol 46 >39 mg/dL    LDL Cholesterol Calculated 92 <100 mg/dL    Non HDL Cholesterol 99 <130 mg/dL   Comprehensive metabolic panel   Result Value Ref Range    Sodium 143 133 - 144 mmol/L    Potassium 4.6 3.4 - 5.3 mmol/L    Chloride 107 94 - 109 mmol/L    Carbon Dioxide 30 20 - 32 mmol/L    Anion Gap 6 3 - 14 mmol/L    Glucose 97 70 - 99 mg/dL    Urea Nitrogen 11 7 - 30 mg/dL    Creatinine 0.59 (L) 0.66 - 1.25 mg/dL    GFR Estimate >90 >60 mL/min/1.7m2    GFR Estimate If Black >90 >60 mL/min/1.7m2    Calcium 8.8 8.5 - 10.1 mg/dL    Bilirubin Total 0.4 0.2 - 1.3 mg/dL    Albumin 4.1 3.4 - 5.0 g/dL    Protein Total 7.1 6.8 - 8.8 g/dL    Alkaline Phosphatase 61 40 - 150 U/L     ALT 23 0 - 70 U/L    AST 11 0 - 45 U/L   PSA, screen   Result Value Ref Range    PSA 0.62 0 - 4 ug/L

## 2018-09-13 NOTE — PROGRESS NOTES
SUBJECTIVE:   CC: Hermelindo Conley is an 58 year old male who presents for preventative health visit.     Physical   Annual:     Getting at least 3 servings of Calcium per day:  Yes    Bi-annual eye exam:  Yes    Dental care twice a year:  Yes    Sleep apnea or symptoms of sleep apnea:  Excessive snoring    Diet:  Regular (no restrictions)    Frequency of exercise:  6-7 days/week    Duration of exercise:  Greater than 60 minutes    Taking medications regularly:  Not Applicable    Additional concerns today:  YES (follow up urinary frequency and corn on foot)    Follow up urinary frequency - UA at last visit negative. Plans PSA and prostate exam today.    corn on foot - looks like has hole in center from last trimming. Has not tried anything on it. Continues to be painful when rubs.    Ear - has had increased itching. Started feeling like draining last week.    Smokin cigarette every couple of weeks. Started smoking in late 20's. Smoking 1/4-1/2 pack per day    Today's PHQ-2 Score:   PHQ-2 (  [employer]) 2018   Q1: Little interest or pleasure in doing things 0   Q2: Feeling down, depressed or hopeless 0   PHQ-2 Score 0     Abuse: Current or Past(Physical, Sexual or Emotional)- No  Do you feel safe in your environment - Yes    Social History   Substance Use Topics     Smoking status: Current Some Day Smoker     Types: Cigarettes     Smokeless tobacco: Never Used      Comment: 1 cigarette every 2-3 weeks     Alcohol use Yes      Comment: rare     Alcohol Use 2018   AUDIT SCORE  1     AUDIT - Alcohol Use Disorders Identification Test - Reproduced from the World Health Organization Audit 2001 (Second Edition) 2018   1.  How often do you have a drink containing alcohol? Monthly or less   2.  How many drinks containing alcohol do you have on a typical day when you are drinking? 1 or 2   3.  How often do you have five or more drinks on one occasion? Never   4.  How often during the last year have you found  that you were not able to stop drinking once you had started? Never   5.  How often during the last year have you failed to do what was normally expected of you because of drinking? Never   6.  How often during the last year have you needed a first drink in the morning to get yourself going after a heavy drinking session? Never   7.  How often during the last year have you had a feeling of guilt or remorse after drinking? Never   8.  How often during the last year have you been unable to remember what happened the night before because of your drinking? Never   9.  Have you or someone else been injured because of your drinking? No   10. Has a relative, friend, doctor or other health care worker been concerned about your drinking or suggested you cut down? No   TOTAL SCORE 1     Last PSA:   PSA   Date Value Ref Range Status   07/13/2011 0.25 0 - 4 ug/L Final     Reviewed orders with patient. Reviewed health maintenance and updated orders accordingly - Yes  Patient Active Problem List   Diagnosis     Tobacco abuse     History of malignant neoplasm of skin     History of basal cell carcinoma     Past Surgical History:   Procedure Laterality Date     ganlion cyst removed right foot Right      [facility] ARTHROSCOPIC TENOTOMOTY, SHOULDER Left 07/08/2015     TONSILLECTOMY & ADENOIDECTOMY       ulnar nerve debridement Left 2014       Social History   Substance Use Topics     Smoking status: Current Some Day Smoker     Types: Cigarettes     Smokeless tobacco: Never Used      Comment: 1 cigarette every 2-3 weeks     Alcohol use Yes      Comment: rare     Family History   Problem Relation Age of Onset     Multiple Sclerosis Father      Diabetes Father      Hyperlipidemia Father          Reviewed and updated as needed this visit by clinical staff  Tobacco  Allergies  Meds  Problems  Med Hx  Surg Hx  Fam Hx  Soc Hx        Reviewed and updated as needed this visit by Provider  Allergies  Meds  Problems        Review of Systems  "  HENT: Positive for ear discharge and hearing loss.    Respiratory: Negative for cough and shortness of breath.    Cardiovascular: Negative for chest pain.   Gastrointestinal: Negative for abdominal pain, constipation and diarrhea.   Genitourinary: Positive for frequency and urgency. Negative for dysuria and hematuria.   Skin: Negative for rash.        corn   All other systems reviewed and are negative.      OBJECTIVE:   /64 (BP Location: Right arm, Patient Position: Sitting, Cuff Size: Adult Regular)  Pulse 72  Temp 96.5  F (35.8  C) (Tympanic)  Ht 5' 9\" (1.753 m)  Wt 144 lb 11.2 oz (65.6 kg)  SpO2 98%  BMI 21.37 kg/m2  Physical Exam  GENERAL: healthy, alert and no distress  EYES: Eyes grossly normal to inspection, PERRL and conjunctivae and sclerae normal  HENT: ear canals and TM's normal, nose and mouth without ulcers or lesions  NECK: no adenopathy, no asymmetry, masses, or scars and thyroid normal to palpation  RESP: lungs clear to auscultation - no rales, rhonchi or wheezes  CV: regular rate and rhythm, normal S1 S2, no S3 or S4, no murmur, click or rub, no peripheral edema and peripheral pulses strong  ABDOMEN: soft, nontender, no hepatosplenomegaly, no masses and bowel sounds normal  Rectal: normal tone, prostate enlarged but smooth  MS: no gross musculoskeletal defects noted, no edema  SKIN: right pinky toe with about 2 cm corn. Ridge around center area that was pared back last time. no suspicious lesions or rashes  NEURO: Normal strength and tone, mentation intact and speech normal  PSYCH: mentation appears normal, affect normal/bright    Diagnostic Test Results:  Results for orders placed or performed in visit on 09/13/18 (from the past 24 hour(s))   Lipid panel reflex to direct LDL Fasting   Result Value Ref Range    Cholesterol 145 <200 mg/dL    Triglycerides 34 <150 mg/dL    HDL Cholesterol 46 >39 mg/dL    LDL Cholesterol Calculated 92 <100 mg/dL    Non HDL Cholesterol 99 <130 mg/dL "   Comprehensive metabolic panel   Result Value Ref Range    Sodium 143 133 - 144 mmol/L    Potassium 4.6 3.4 - 5.3 mmol/L    Chloride 107 94 - 109 mmol/L    Carbon Dioxide 30 20 - 32 mmol/L    Anion Gap 6 3 - 14 mmol/L    Glucose 97 70 - 99 mg/dL    Urea Nitrogen 11 7 - 30 mg/dL    Creatinine 0.59 (L) 0.66 - 1.25 mg/dL    GFR Estimate >90 >60 mL/min/1.7m2    GFR Estimate If Black >90 >60 mL/min/1.7m2    Calcium 8.8 8.5 - 10.1 mg/dL    Bilirubin Total 0.4 0.2 - 1.3 mg/dL    Albumin 4.1 3.4 - 5.0 g/dL    Protein Total 7.1 6.8 - 8.8 g/dL    Alkaline Phosphatase 61 40 - 150 U/L    ALT 23 0 - 70 U/L    AST 11 0 - 45 U/L   PSA, screen   Result Value Ref Range    PSA 0.62 0 - 4 ug/L     Procedure Note:  Corn was pared down using a 15 blade scalpel. No complications. Patient tolerated the procedure well.    ASSESSMENT/PLAN:   1. Routine general medical examination at a health care facility  Will schedule colonoscopy  Declines flu vaccine  Diabetes/cholesterol screen today  Discussed Shingrix vaccine.  Does not meet criteria for lung cancer screening - about 10 pack/year smoker so far    2. Primary insomnia  Controlled. Does not use nightly  - traZODone (DESYREL) 50 MG tablet; Take 1 tablet (50 mg) by mouth nightly as needed for sleep  Dispense: 60 tablet; Refill: 4    3. Corn or callus  Trimmed edge of corn down further. Discussed corn pads. If not improving, recommend schedule with podiatry.    4. Benign prostatic hyperplasia with urinary frequency  Symptoms c/w BPH. Prostate enlarged. No nodules palpated. PSA negative. Will start Tamsulosin. Discussed possible side effects. Will let me know if wants a 90 day supply, otherwise will f/u if not improving  - tamsulosin (FLOMAX) 0.4 MG capsule; Take 1 capsule (0.4 mg) by mouth daily  Dispense: 30 capsule; Refill: 11    5. Screen for colon cancer  - GASTROENTEROLOGY ADULT REF PROCEDURE ONLY Mao Abarca [phone]; [facility]    6. Need for hepatitis C screening test  -  "Hepatitis C Screen Reflex to HCV RNA Quant and Genotype    7. Screening for prostate cancer  - PSA, screen    8. Screening for diabetes mellitus  - Comprehensive metabolic panel    9. Screening cholesterol level  - Lipid panel reflex to direct LDL Fasting    COUNSELING:   Reviewed preventive health counseling, as reflected in patient instructions  Special attention given to:        Consider AAA screening for ages 65-75 and smoking history       Regular exercise       Healthy diet/nutrition       Immunizations    Declined: Influenza due to Conscientious objector               Consider Hep C screening for patients born between 1945 and 1965       HIV screeninx in teen years, 1x in adult years, and at intervals if high risk       Colon cancer screening       Prostate cancer screening       Consider lung cancer screening for ages 55-80 years and 30 pack-year smoking history      BP Readings from Last 1 Encounters:   18 112/64     Estimated body mass index is 21.37 kg/(m^2) as calculated from the following:    Height as of this encounter: 5' 9\" (1.753 m).    Weight as of this encounter: 144 lb 11.2 oz (65.6 kg).     reports that he has been smoking Cigarettes.  He has never used smokeless tobacco.  Tobacco Cessation Action Plan: Information offered: Patient not interested at this time  very infrequent smoker    Counseling Resources:  ATP IV Guidelines  Pooled Cohorts Equation Calculator  FRAX Risk Assessment  ICSI Preventive Guidelines  Dietary Guidelines for Americans, 2010  USDA's MyPlate  ASA Prophylaxis  Lung CA Screening    Aditi Coronel MD  [facility] [place]  "

## 2018-09-13 NOTE — PATIENT INSTRUCTIONS
Preventive Health Recommendations  Male Ages 50 - 64    Yearly exam:             See your health care provider every year in order to  o   Review health changes.   o   Discuss preventive care.    o   Review your medicines if your doctor has prescribed any.     Have a cholesterol test every 5 years, or more frequently if you are at risk for high cholesterol/heart disease.     Have a diabetes test (fasting glucose) every three years. If you are at risk for diabetes, you should have this test more often.     Have a colonoscopy at age 50, or have a yearly FIT test (stool test). These exams will check for colon cancer.      Talk with your health care provider about whether or not a prostate cancer screening test (PSA) is right for you.    You should be tested each year for STDs (sexually transmitted diseases), if you re at risk.     Shots: Get a flu shot each year. Get a tetanus shot every 10 years.     Nutrition:    Eat at least 5 servings of fruits and vegetables daily.     Eat whole-grain bread, whole-wheat pasta and brown rice instead of white grains and rice.     Get adequate Calcium and Vitamin D.     Lifestyle    Exercise for at least 150 minutes a week (30 minutes a day, 5 days a week). This will help you control your weight and prevent disease.     Limit alcohol to one drink per day.     No smoking.     Wear sunscreen to prevent skin cancer.     See your dentist every six months for an exam and cleaning.     See your eye doctor every 1 to 2 years.  ----------------  1. Labs today: cholesterol, diabetes screen, liver function, kidney function and hepatitis C screening  2. You will be contacted to set up the colonoscopy  3. Check with insurance to see if Shingrix shingles vaccine is covered OR can stop in pharmacy and have them run it through your insurance and get the vaccine done there.  - 2nd dose is 2-6 months after the first dose  4. Start tamsulosin 0.4 mg once a day to help with urinary symptoms  5.  Trimmed corn   - try corn pads to help finish getting rid of corn  - if not improving, will have you see Podiatry

## 2018-09-13 NOTE — MR AVS SNAPSHOT
After Visit Summary   9/13/2018    Hermelindo Conley    MRN: [id number]           Patient Information     Date Of Birth          1960        Visit Information        Provider Department      9/13/2018 8:20 AM Aditi Coronel MD [facility] [place]        Today's Diagnoses     Routine general medical examination at a health care facility    -  1    Primary insomnia        Corn or callus        Screen for colon cancer        Need for hepatitis C screening test        Urinary frequency        Screening for prostate cancer        Screening for diabetes mellitus        Screening cholesterol level        Benign prostatic hyperplasia with urinary frequency          Care Instructions      Preventive Health Recommendations  Male Ages 50 - 64    Yearly exam:             See your health care provider every year in order to  o   Review health changes.   o   Discuss preventive care.    o   Review your medicines if your doctor has prescribed any.     Have a cholesterol test every 5 years, or more frequently if you are at risk for high cholesterol/heart disease.     Have a diabetes test (fasting glucose) every three years. If you are at risk for diabetes, you should have this test more often.     Have a colonoscopy at age 50, or have a yearly FIT test (stool test). These exams will check for colon cancer.      Talk with your health care provider about whether or not a prostate cancer screening test (PSA) is right for you.    You should be tested each year for STDs (sexually transmitted diseases), if you re at risk.     Shots: Get a flu shot each year. Get a tetanus shot every 10 years.     Nutrition:    Eat at least 5 servings of fruits and vegetables daily.     Eat whole-grain bread, whole-wheat pasta and brown rice instead of white grains and rice.     Get adequate Calcium and Vitamin D.     Lifestyle    Exercise for at least 150 minutes a week (30 minutes a day, 5 days a week). This will help you  control your weight and prevent disease.     Limit alcohol to one drink per day.     No smoking.     Wear sunscreen to prevent skin cancer.     See your dentist every six months for an exam and cleaning.     See your eye doctor every 1 to 2 years.  ----------------  1. Labs today: cholesterol, diabetes screen, liver function, kidney function and hepatitis C screening  2. You will be contacted to set up the colonoscopy  3. Check with insurance to see if Shingrix shingles vaccine is covered OR can stop in pharmacy and have them run it through your insurance and get the vaccine done there.  - 2nd dose is 2-6 months after the first dose  4. Start tamsulosin 0.4 mg once a day to help with urinary symptoms  5. Trimmed corn   - try corn pads to help finish getting rid of corn  - if not improving, will have you see Podiatry            Follow-ups after your visit        Additional Services     GASTROENTEROLOGY ADULT REF PROCEDURE ONLY Mao Tevin [phone]; [facility]       Last Lab Result: Creatinine (mg/dL)       Date                     Value                 01/19/2014               0.78             ----------  There is no height or weight on file to calculate BMI.     Needed:  No  Language:  English    Patient will be contacted to schedule procedure.     Please be aware that coverage of these services is subject to the terms and limitations of your health insurance plan.  Call member services at your health plan with any benefit or coverage questions.  Any procedures must be performed at a [place] facility OR coordinated by your clinic's referral office.    Please bring the following with you to your appointment:    (1) Any X-Rays, CTs or MRIs which have been performed.  Contact the facility where they were done to arrange for  prior to your scheduled appointment.    (2) List of current medications   (3) This referral request   (4) Any documents/labs given to you for this referral              "     Follow-up notes from your care team     Return in about 1 year (around 2019).      Who to contact     If you have questions or need follow up information about today's clinic visit or your schedule please contact [facility] [place] directly at [phone].  Normal or non-critical lab and imaging results will be communicated to you by MyChart, letter or phone within 4 business days after the clinic has received the results. If you do not hear from us within 7 days, please contact the clinic through MyChart or phone. If you have a critical or abnormal lab result, we will notify you by phone as soon as possible.  Submit refill requests through [employer]) or call your pharmacy and they will forward the refill request to us. Please allow 3 business days for your refill to be completed.          Additional Information About Your Visit        MyChart Information     [employer] lets you send messages to your doctor, view your test results, renew your prescriptions, schedule appointments and more. To sign up, go to [email or web address]) . Click on \"Log in\" on the left side of the screen, which will take you to the Welcome page. Then click on \"Sign up Now\" on the right side of the page.     You will be asked to enter the access code listed below, as well as some personal information. Please follow the directions to create your username and password.     Your access code is: [email or web address]  Expires: 2018 11:33 AM     Your access code will  in 90 days. If you need help or a new code, please call your [place] clinic or [phone].        Care EveryWhere ID     This is your Care EveryWhere ID. This could be used by other organizations to access your [place] medical records  [id number]        Your Vitals Were     Pulse Temperature Height Pulse Oximetry BMI (Body Mass Index)       72 96.5  F (35.8  C) (Tympanic) 5' 9\" (1.753 m) 98% 21.37 kg/m2        Blood Pressure from Last 3 Encounters:   18 " 112/64   08/30/18 112/60   11/16/17 122/81    Weight from Last 3 Encounters:   09/13/18 144 lb 11.2 oz (65.6 kg)   08/30/18 144 lb 12.8 oz (65.7 kg)   07/03/17 153 lb (69.4 kg)              We Performed the Following     Comprehensive metabolic panel     GASTROENTEROLOGY ADULT REF PROCEDURE ONLY Mao Abarca [phone]; [facility]     Hepatitis C Screen Reflex to HCV RNA Quant and Genotype     Lipid panel reflex to direct LDL Fasting     PSA, screen          Today's Medication Changes          These changes are accurate as of 9/13/18  9:04 AM.  If you have any questions, ask your nurse or doctor.               Start taking these medicines.        Dose/Directions    tamsulosin 0.4 MG capsule   Commonly known as:  FLOMAX   Used for:  Benign prostatic hyperplasia with urinary frequency   Started by:  Aditi Coronel MD        Dose:  0.4 mg   Take 1 capsule (0.4 mg) by mouth daily   Quantity:  30 capsule   Refills:  11         These medicines have changed or have updated prescriptions.        Dose/Directions    traZODone 50 MG tablet   Commonly known as:  DESYREL   This may have changed:  how much to take   Used for:  Primary insomnia   Changed by:  Aditi Coronel MD        Dose:  50 mg   Take 1 tablet (50 mg) by mouth nightly as needed for sleep   Quantity:  60 tablet   Refills:  4            Where to get your medicines      These medications were sent to [facility] [address] [place] [facility] [place] - [address]  [address] [place] [address]     Phone:  [phone]     tamsulosin 0.4 MG capsule    traZODone 50 MG tablet                Primary Care Provider Office Phone # Fax #    Aditi Coronel [phone]       [facility] [place] [address]        Equal Access to Services     [facility] HILARY AH: Mikael Mojica, faina chahal, rachael narayan. So [facility] [phone].    ATENCIÓN: Si  yair sanderson, tiene a mo disposición servicios gratuitos de asistencia lingüística. Dara linda [phone].    We comply with applicable federal civil rights laws and [place] laws. We do not discriminate on the basis of race, color, national origin, age, disability, sex, sexual orientation, or gender identity.            Thank you!     Thank you for choosing [facility] [place]  for your care. Our goal is always to provide you with excellent care. Hearing back from our patients is one way we can continue to improve our services. Please take a few minutes to complete the written survey that you may receive in the mail after your visit with us. Thank you!             Your Updated Medication List - Protect others around you: Learn how to safely use, store and throw away your medicines at www.disposemymeds.org.          This list is accurate as of 9/13/18  9:04 AM.  Always use your most recent med list.                   Brand Name Dispense Instructions for use Diagnosis    tamsulosin 0.4 MG capsule    FLOMAX    30 capsule    Take 1 capsule (0.4 mg) by mouth daily    Benign prostatic hyperplasia with urinary frequency       traZODone 50 MG tablet    DESYREL    60 tablet    Take 1 tablet (50 mg) by mouth nightly as needed for sleep    Primary insomnia

## 2018-11-01 ENCOUNTER — TRANSFERRED RECORDS (OUTPATIENT)
Dept: HEALTH INFORMATION MANAGEMENT | Facility: CLINIC | Age: 58
End: 2018-11-01

## 2019-10-15 DIAGNOSIS — F51.01 PRIMARY INSOMNIA: ICD-10-CM

## 2019-10-15 NOTE — TELEPHONE ENCOUNTER
"Requested Prescriptions   Pending Prescriptions Disp Refills     traZODone (DESYREL) 50 MG tablet    Last Written Prescription Date:  9/13/2018  Last Fill Quantity: 60,  # refills: 4   Last office visit: 9/13/2018 with prescribing provider:  Aditi Coronel     Future Office Visit:     60 tablet 4     Sig: Take 1 tablet (50 mg) by mouth nightly as needed for sleep       Serotonin Modulators Failed - 10/15/2019  2:05 PM        Failed - Recent (12 mo) or future (30 days) visit within the authorizing provider's specialty     Patient has had an office visit with the authorizing provider or a provider within the authorizing providers department within the previous 12 mos or has a future within next 30 days. See \"Patient Info\" tab in inbasket, or \"Choose Columns\" in Meds & Orders section of the refill encounter.              Passed - Medication is active on med list        Passed - Patient is age 18 or older          "

## 2019-10-16 DIAGNOSIS — F51.01 PRIMARY INSOMNIA: ICD-10-CM

## 2019-10-16 RX ORDER — TRAZODONE HYDROCHLORIDE 50 MG/1
50 TABLET, FILM COATED ORAL
Qty: 60 TABLET | Refills: 0 | Status: SHIPPED | OUTPATIENT
Start: 2019-10-16 | End: 2020-01-29

## 2019-10-16 RX ORDER — TRAZODONE HYDROCHLORIDE 50 MG/1
50 TABLET, FILM COATED ORAL
Qty: 60 TABLET | Refills: 4 | OUTPATIENT
Start: 2019-10-16

## 2019-10-16 NOTE — TELEPHONE ENCOUNTER
"Requested Prescriptions   Pending Prescriptions Disp Refills     traZODone (DESYREL) 50 MG tablet [Pharmacy Med Name: TRAZODONE 50 MG TABLET]    Last Written Prescription Date:  9/13/2018  Last Fill Quantity: 60,  # refills: 4   Last office visit: 9/13/2018 with prescribing provider:  Aditi Coronel     Future Office Visit:     60 tablet 4     Sig: TAKE 1 TABLET (50 MG) BY MOUTH NIGHTLY AS NEEDED FOR SLEEP       Serotonin Modulators Failed - 10/16/2019 11:17 AM        Failed - Recent (12 mo) or future (30 days) visit within the authorizing provider's specialty     Patient has had an office visit with the authorizing provider or a provider within the authorizing providers department within the previous 12 mos or has a future within next 30 days. See \"Patient Info\" tab in inbasket, or \"Choose Columns\" in Meds & Orders section of the refill encounter.              Passed - Medication is active on med list        Passed - Patient is age 18 or older          "

## 2019-10-16 NOTE — TELEPHONE ENCOUNTER
Medication is being filled for 1 time refill only due to:  Patient needs to be seen because it has been more than one year since last visit.   Please call to schedule appt  Tatiana Boothe RN

## 2019-10-16 NOTE — TELEPHONE ENCOUNTER
I called and spoke to the pt. I informed him of the providers message and he will call back at a later date to schedule.   Sarah Adan on 10/16/2019 at 6:05 PM

## 2020-01-27 NOTE — PROGRESS NOTES
Pre-Visit Planning     Future Appointments   Date Time Provider Department Center   1/29/2020  8:10 AM Delphine Ronquillo MD [facility]     Arrival Time for this Appointment:  8:10 AM   Appointment Notes for this encounter:   Follow up    Questionnaires Reviewed/Assigned  Additional questionnaires assigned        Patient preferred phone number: [phone]    Unable to reach patient and unable to leave voicemail.    Sundar Nayak at 11:16 AM on 1/28/2020  [facility]  Phone [phone]

## 2020-01-28 ENCOUNTER — TELEPHONE (OUTPATIENT)
Dept: PEDIATRICS | Facility: CLINIC | Age: 60
End: 2020-01-28

## 2020-01-28 NOTE — TELEPHONE ENCOUNTER
[facility] called for PVP with no answer. Voicemail has a message asking for messages to be left for someone of a different name.    Sundar Nayak at 11:16 AM on 1/28/2020  [facility]  Phone [phone]

## 2020-01-28 NOTE — PROGRESS NOTES
Subjective     Hermelindo Conley is a 59 year old male who presents to clinic today for the following health issues:    Here to establish care with Dr. Ronquillo - transfer from Dr. Coronel.  And issues below:    HPI   Pt is here today to get 3 wounds checked that he has. One behind his right ear, one on the left side of his chest and one on the bottom of right foot. Patient with personal h/o basal cell cancer.  Lesion on left chest hasn't healed in over 3 months, keeps scabbing and breaking open, then scabbing again.  Lesion behind right ear starting to heal up.  Right foot - painful when walking.  Works at home [employer] and on feet all day.     Urinary () - Male  Duration of complaint: x3 months  - getting worse.  Urinates as much as 15 times per day and 3-4 times at night.  Description:   Dysuria (painful urination): no  Hematuria (blood in urine): no  Frequency: YES  Are you urinating at night : YES  Hesitancy (delay in urine): YES  Retention (unable to empty): YES  Decrease in urinary flow: YES  Incontinence: no  Progression of Symptoms:  worsening  Accompanying Signs & Symptoms:  Fever: no  Back/Flank pain: no  History:   History of frequent UTI's: no  History of kidney stones: no  History of hernias: no  Personal or Family history of Prostate problems: no  Sexually active: no   Precipitating factors:   Na   Alleviating factors:  Urethral discharge: no  Testicle lumps/masses/pain: no  Nausea and/or vomiting: no  Abdominal pain: no  Therapies Tried and outcome: tried tamsulosin with Dr. Coronel - sounds like patient only took for a month - although 11 refills with this.  Did not notice any difference.  Currently not taking.    Insomnia - currently on trazodone 50mg - takes nightly.  Has seen multiple sleep med doctors, no clear reason why trouble sleeping.  One pill gets him 4-6 hours of sleep.    Reviewed and updated as needed this visit by Provider  Meds         Review of Systems   ROS COMP: Constitutional, HEENT,  "cardiovascular, pulmonary, gi and gu systems are negative, except as otherwise noted.      Objective    /80 (BP Location: Right arm, Patient Position: Chair, Cuff Size: Adult Regular)   Pulse 80   Temp 98.4  F (36.9  C) (Oral)   Resp 16   Ht 1.753 m (5' 9\")   Wt 66.5 kg (146 lb 11.2 oz)   SpO2 99%   BMI 21.66 kg/m    Body mass index is 21.66 kg/m .  Physical Exam   GENERAL APPEARANCE: healthy, alert and no distress  RESP: lungs clear to auscultation - no rales, rhonchi or wheezes  CV: regular rates and rhythm, normal S1 S2, no S3 or S4 and no murmur, click or rub  SKIN: behind right ear 1cm shiny slightly raised patch with mild erythema, left chest wall with scaly lesion, silver dollar sized with area of open sore.  Right foot with tender callous with central 3mm hyperpigmentation    Diagnostic Test Results:  Labs reviewed in Epic        Assessment & Plan     1. Primary insomnia  Ok to continue, refill  - traZODone (DESYREL) 50 MG tablet; Take 1 tablet (50 mg) by mouth nightly as needed for sleep  Dispense: 90 tablet; Refill: 3    2. Benign prostatic hyperplasia with urinary frequency  Recheck labs - if all normal, would recommend trial of either increased flomax or other med.  If not helping after that, then refer to urology  - Basic metabolic panel  - PSA, screen  - UA with Microscopic reflex to Culture    3. Screening for prostate cancer    4. Atypical nevi  Concern that these new lesions are cancer, jaylyn non healing chest lesion.    - DERMATOLOGY REFERRAL    5. History of basal cell carcinoma  - DERMATOLOGY REFERRAL    6. Tobacco abuse  Down to 3 cigs per day (down from 1 ppd) - not ready to completely quit. Has quit for a few months with chantix in the past, might consider this in the future.            Patient Instructions   Cough - try mucinex to loosen phlegm in the chest.  The claritin will help with nasal drainage down the back of your throat.    Dermatology referral    Urine test and PSA " today      Return in about 1 year (around 1/29/2021) for Preventative Visit (physical).    Delphine Ronquillo MD  [facility]

## 2020-01-29 ENCOUNTER — OFFICE VISIT (OUTPATIENT)
Dept: PEDIATRICS | Facility: CLINIC | Age: 60
End: 2020-01-29
Payer: COMMERCIAL

## 2020-01-29 ENCOUNTER — TELEPHONE (OUTPATIENT)
Dept: PEDIATRICS | Facility: CLINIC | Age: 60
End: 2020-01-29

## 2020-01-29 VITALS
WEIGHT: 146.7 LBS | OXYGEN SATURATION: 99 % | HEART RATE: 80 BPM | HEIGHT: 69 IN | DIASTOLIC BLOOD PRESSURE: 80 MMHG | SYSTOLIC BLOOD PRESSURE: 128 MMHG | TEMPERATURE: 98.4 F | RESPIRATION RATE: 16 BRPM | BODY MASS INDEX: 21.73 KG/M2

## 2020-01-29 DIAGNOSIS — D22.9 ATYPICAL NEVI: ICD-10-CM

## 2020-01-29 DIAGNOSIS — Z72.0 TOBACCO ABUSE: ICD-10-CM

## 2020-01-29 DIAGNOSIS — Z85.828 HISTORY OF BASAL CELL CARCINOMA: ICD-10-CM

## 2020-01-29 DIAGNOSIS — F51.01 PRIMARY INSOMNIA: Primary | ICD-10-CM

## 2020-01-29 DIAGNOSIS — Z12.5 SCREENING FOR PROSTATE CANCER: ICD-10-CM

## 2020-01-29 DIAGNOSIS — R35.0 BENIGN PROSTATIC HYPERPLASIA WITH URINARY FREQUENCY: Primary | ICD-10-CM

## 2020-01-29 DIAGNOSIS — R35.0 BENIGN PROSTATIC HYPERPLASIA WITH URINARY FREQUENCY: ICD-10-CM

## 2020-01-29 DIAGNOSIS — N40.1 BENIGN PROSTATIC HYPERPLASIA WITH URINARY FREQUENCY: Primary | ICD-10-CM

## 2020-01-29 DIAGNOSIS — N40.1 BENIGN PROSTATIC HYPERPLASIA WITH URINARY FREQUENCY: ICD-10-CM

## 2020-01-29 LAB
ALBUMIN UR-MCNC: NEGATIVE MG/DL
ANION GAP SERPL CALCULATED.3IONS-SCNC: 5 MMOL/L (ref 3–14)
APPEARANCE UR: CLEAR
BILIRUB UR QL STRIP: NEGATIVE
BUN SERPL-MCNC: 13 MG/DL (ref 7–30)
CALCIUM SERPL-MCNC: 8.9 MG/DL (ref 8.5–10.1)
CHLORIDE SERPL-SCNC: 106 MMOL/L (ref 94–109)
CO2 SERPL-SCNC: 28 MMOL/L (ref 20–32)
COLOR UR AUTO: YELLOW
CREAT SERPL-MCNC: 0.61 MG/DL (ref 0.66–1.25)
GFR SERPL CREATININE-BSD FRML MDRD: >90 ML/MIN/{1.73_M2}
GLUCOSE SERPL-MCNC: 96 MG/DL (ref 70–99)
GLUCOSE UR STRIP-MCNC: NEGATIVE MG/DL
HGB UR QL STRIP: ABNORMAL
KETONES UR STRIP-MCNC: NEGATIVE MG/DL
LEUKOCYTE ESTERASE UR QL STRIP: NEGATIVE
NITRATE UR QL: NEGATIVE
PH UR STRIP: 7 PH (ref 5–7)
POTASSIUM SERPL-SCNC: 3.7 MMOL/L (ref 3.4–5.3)
PSA SERPL-ACNC: 0.92 UG/L (ref 0–4)
RBC #/AREA URNS AUTO: ABNORMAL /HPF
SODIUM SERPL-SCNC: 139 MMOL/L (ref 133–144)
SOURCE: ABNORMAL
SP GR UR STRIP: 1.01 (ref 1–1.03)
UROBILINOGEN UR STRIP-ACNC: 1 EU/DL (ref 0.2–1)
WBC #/AREA URNS AUTO: ABNORMAL /HPF

## 2020-01-29 PROCEDURE — G0103 PSA SCREENING: HCPCS | Performed by: PEDIATRICS

## 2020-01-29 PROCEDURE — 81001 URINALYSIS AUTO W/SCOPE: CPT | Performed by: PEDIATRICS

## 2020-01-29 PROCEDURE — 80048 BASIC METABOLIC PNL TOTAL CA: CPT | Performed by: PEDIATRICS

## 2020-01-29 PROCEDURE — 99214 OFFICE O/P EST MOD 30 MIN: CPT | Performed by: PEDIATRICS

## 2020-01-29 PROCEDURE — 36415 COLL VENOUS BLD VENIPUNCTURE: CPT | Performed by: PEDIATRICS

## 2020-01-29 RX ORDER — TRAZODONE HYDROCHLORIDE 50 MG/1
50 TABLET, FILM COATED ORAL
Qty: 90 TABLET | Refills: 3 | Status: SHIPPED | OUTPATIENT
Start: 2020-01-29 | End: 2020-12-16

## 2020-01-29 RX ORDER — TERAZOSIN 1 MG/1
CAPSULE ORAL
Qty: 70 CAPSULE | Refills: 3 | Status: SHIPPED | OUTPATIENT
Start: 2020-01-29 | End: 2021-03-31

## 2020-01-29 ASSESSMENT — MIFFLIN-ST. JEOR: SCORE: 1465.81

## 2020-01-29 NOTE — PATIENT INSTRUCTIONS
Cough - try mucinex to loosen phlegm in the chest.  The claritin will help with nasal drainage down the back of your throat.    Dermatology referral    Urine test and PSA today

## 2020-02-25 ENCOUNTER — TRANSFERRED RECORDS (OUTPATIENT)
Dept: HEALTH INFORMATION MANAGEMENT | Facility: CLINIC | Age: 60
End: 2020-02-25

## 2020-03-04 ENCOUNTER — TELEPHONE (OUTPATIENT)
Dept: PEDIATRICS | Facility: CLINIC | Age: 60
End: 2020-03-04

## 2020-03-04 DIAGNOSIS — R29.91 ABNORMAL FINDING OF FOOT: Primary | ICD-10-CM

## 2020-03-04 NOTE — TELEPHONE ENCOUNTER
Order/Referral Request:  Who is requesting: Patient  Orders being requested: Referral for Podiatry  Reason service is needed/diagnosis: Patient was referred to a dermatologist by Dr. Ronquillo. Patient stated he went to the dermatologist and they told him he needs to be referred to a podiatrist.  When are orders needed by: asap  Has this been discussed with Provider: Yes  Does patient have a preference on a Group/Provider/Facility? [facility]  Does patient have an appointment scheduled: No  Where to send Orders: N/A  Okay to leave detailed message?  No at Home number on file [phone] (home)--- Patient would like a call after 11am today    eDysi Collins,

## 2020-03-08 NOTE — NURSING NOTE
"Chief Complaint   Patient presents with     Recheck Medication       Initial /74 (BP Location: Left arm, Patient Position: Chair, Cuff Size: Adult Regular)  Pulse 94  Temp 98.3  F (36.8  C) (Tympanic)  Ht 5' 9\" (1.753 m)  Wt 153 lb (69.4 kg)  SpO2 97%  BMI 22.59 kg/m2 Estimated body mass index is 22.59 kg/(m^2) as calculated from the following:    Height as of this encounter: 5' 9\" (1.753 m).    Weight as of this encounter: 153 lb (69.4 kg).  Medication Reconciliation: complete   Brenda Moran LPN      "
2 seconds or less

## 2020-03-18 ENCOUNTER — OFFICE VISIT (OUTPATIENT)
Dept: PODIATRY | Facility: CLINIC | Age: 60
End: 2020-03-18
Attending: PEDIATRICS
Payer: COMMERCIAL

## 2020-03-18 VITALS
WEIGHT: 157 LBS | BODY MASS INDEX: 23.25 KG/M2 | DIASTOLIC BLOOD PRESSURE: 70 MMHG | SYSTOLIC BLOOD PRESSURE: 118 MMHG | HEIGHT: 69 IN

## 2020-03-18 DIAGNOSIS — L84 CALLUS OF FOOT: Primary | ICD-10-CM

## 2020-03-18 DIAGNOSIS — M21.621 TAILOR'S BUNION OF RIGHT FOOT: ICD-10-CM

## 2020-03-18 DIAGNOSIS — M79.671 RIGHT FOOT PAIN: ICD-10-CM

## 2020-03-18 PROCEDURE — 99203 OFFICE O/P NEW LOW 30 MIN: CPT | Performed by: PODIATRIST

## 2020-03-18 ASSESSMENT — MIFFLIN-ST. JEOR: SCORE: 1507.53

## 2020-03-18 NOTE — PROGRESS NOTES
ASSESSMENT/PLAN:    Encounter Diagnoses   Name Primary?     Callus of foot Yes     Right foot pain      Tailor's bunion of right foot      The patient was told that trimming the calluses might be an out of pocket expense.  I offered to provide this service today without charge.  He was instructed to check with his insurance to see if he has coverage for future visits.    The hyperkeratotic lesion, plantar right fifth metatarsal head, was paired down with a #15 scalpel.  He reported immediate relief with weight bearing.    I explained that the callus is that the skin is normal response to his bony architecture.  I explained this is really not a skin problem.  The skin is reacting to stress as it should.  I explained this is something he will have to manage and live with.  We discussed the gait cycle and how rigid soled shoes will help offload the ball of the foot.    Recommendations:  Pumice stone to file the lesions down  Stiffer soled shoes to offload the forefoot during gait.  Cushioned shoe inserts with/ without a hole cut below the lesion  Follow up for periodic pairing of the lesions    Body mass index is 23.18 kg/m .      Omar Stafford DPM, FACFAS, MS    [place] [facility] of Podiatry/Foot & Ankle Surgery      ____________________________________________________________________    HPI:          Chief Complaint: corn on right foot   The lesion is sub right fifth metatarsal head  Onset of problem: 3-4 months  Pain/ discomfort is described as:  Burning, sharp pain  Ratin/10   Frequency:  daily    The pain is made worse with standing on foot  Previous treatment: corn patches. He also saw dermatology  *  Patient Active Problem List   Diagnosis     Tobacco abuse     History of malignant neoplasm of skin     History of basal cell carcinoma     Primary insomnia   *  *  Past Surgical History:   Procedure Laterality Date     ganlion cyst removed right foot Right      HC ARTHROSCOPIC TENOTOMOTY, SHOULDER Left  07/08/2015     TONSILLECTOMY & ADENOIDECTOMY       ulnar nerve debridement Left 2014   *  *  Current Outpatient Medications   Medication Sig Dispense Refill     terazosin (HYTRIN) 1 MG capsule Take 1 capsule (1 mg) by mouth daily for 7 days, THEN 2 capsules (2 mg) daily for 7 days, THEN 3 capsules (3 mg) daily for 7 days, THEN 4 capsules (4 mg) daily. 70 capsule 3     traZODone (DESYREL) 50 MG tablet Take 1 tablet (50 mg) by mouth nightly as needed for sleep 90 tablet 3       ROS:     A 10-point review of systems was performed and is positive for that noted above in the HPI and as seen below.  All other areas are negative.     Numbness in feet?  no   Calf pain with walking? no  Recent foot/ankle injury? no  Weight change over past 12 months? no  Self perception as overweight? no  Recent flu-like symptoms? no  Joint pain other than feet ? no    Social History: Employment:  Customer service;  Exercise/Physical activity:  no;  Tobacco use:  yes  Social History     Socioeconomic History     Marital status:      Spouse name: Not on file     Number of children: Not on file     Years of education: Not on file     Highest education level: Not on file   Occupational History     Not on file   Social Needs     Financial resource strain: Not on file     Food insecurity     Worry: Not on file     Inability: Not on file     Transportation needs     Medical: Not on file     Non-medical: Not on file   Tobacco Use     Smoking status: Current Some Day Smoker     Packs/day: 0.30     Years: 30.00     Pack years: 9.00     Types: Cigarettes     Smokeless tobacco: Never Used     Tobacco comment: 1 cigarette every 2-3 weeks   Substance and Sexual Activity     Alcohol use: Yes     Comment: rare     Drug use: No     Sexual activity: Not on file   Lifestyle     Physical activity     Days per week: Not on file     Minutes per session: Not on file     Stress: Not on file   Relationships     Social connections     Talks on phone: Not on  "file     Gets together: Not on file     Attends Orthodox service: Not on file     Active member of club or organization: Not on file     Attends meetings of clubs or organizations: Not on file     Relationship status: Not on file     Intimate partner violence     Fear of current or ex partner: Not on file     Emotionally abused: Not on file     Physically abused: Not on file     Forced sexual activity: Not on file   Other Topics Concern     Parent/sibling w/ CABG, MI or angioplasty before 65F 55M? Not Asked   Social History Narrative     Not on file       Family history:  Family History   Problem Relation Age of Onset     Multiple Sclerosis Father      Diabetes Father      Hyperlipidemia Father        Rheumatoid arthritis:  no  Foot Problems: no  Diabetes: parent      EXAM:    Vitals: /70   Ht 1.753 m (5' 9\")   Wt 71.2 kg (157 lb)   BMI 23.18 kg/m    BMI: Body mass index is 23.18 kg/m .  Height: 5' 9\"    Constitutional/ general:  Pt is in no apparent distress, appears well-nourished.  Cooperative with history and physical exam.     Vascular:  Pedal pulses are palpable bilaterally for both the DP and PT arteries.  CFT < 3 sec.  No edema.  Pedal hair growth noted.     Neuro:  Alert and oriented x 3. Coordinated gait.  Light touch sensation is intact to the L4, L5, S1 distributions. No obvious deficits.  No evidence of neurological-based weakness, spasticity, or contracture in the lower extremities.     Derm: Normal texture and turgor.  No erythema, ecchymosis, or cyanosis.  No open lesions.   There is a nucleated hyperkeratotic lesion below the right fifth metatarsal head. This is painful.      Musculoskeletal:    Lower extremity muscle strength is normal.  Patient is ambulatory without an assistive device or brace   No gross deformities.  High medial longitudinal arches.  The right fifth metatarsal head is very prominent.       "

## 2020-03-18 NOTE — LETTER
3/18/2020         RE: Hermelindo Conley  [address] [place] [place] [address]        Dear Colleague,    Thank you for referring your patient, Hermelindo Conley, to the [facility]. Please see a copy of my visit note below.      ASSESSMENT/PLAN:    Encounter Diagnoses   Name Primary?     Callus of foot Yes     Right foot pain      Tailor's bunion of right foot      The patient was told that trimming the calluses might be an out of pocket expense.  I offered to provide this service today without charge.  He was instructed to check with his insurance to see if he has coverage for future visits.    The hyperkeratotic lesion, plantar right fifth metatarsal head, was paired down with a #15 scalpel.  He reported immediate relief with weight bearing.    I explained that the callus is that the skin is normal response to his bony architecture.  I explained this is really not a skin problem.  The skin is reacting to stress as it should.  I explained this is something he will have to manage and live with.  We discussed the gait cycle and how rigid soled shoes will help offload the ball of the foot.    Recommendations:  Pumice stone to file the lesions down  Stiffer soled shoes to offload the forefoot during gait.  Cushioned shoe inserts with/ without a hole cut below the lesion  Follow up for periodic pairing of the lesions    Body mass index is 23.18 kg/m .      Omar Stafford DPM, FACFAS, MS    [place] [facility] of Podiatry/Foot & Ankle Surgery      ____________________________________________________________________    HPI:          Chief Complaint: corn on right foot   The lesion is sub right fifth metatarsal head  Onset of problem: 3-4 months  Pain/ discomfort is described as:  Burning, sharp pain  Ratin/10   Frequency:  daily    The pain is made worse with standing on foot  Previous treatment: corn patches. He also saw dermatology  *  Patient Active Problem List   Diagnosis     Tobacco abuse     History of  malignant neoplasm of skin     History of basal cell carcinoma     Primary insomnia   *  *  Past Surgical History:   Procedure Laterality Date     ganlion cyst removed right foot Right      [facility] ARTHROSCOPIC TENOTOMOTY, SHOULDER Left 07/08/2015     TONSILLECTOMY & ADENOIDECTOMY       ulnar nerve debridement Left 2014   *  *  Current Outpatient Medications   Medication Sig Dispense Refill     terazosin (HYTRIN) 1 MG capsule Take 1 capsule (1 mg) by mouth daily for 7 days, THEN 2 capsules (2 mg) daily for 7 days, THEN 3 capsules (3 mg) daily for 7 days, THEN 4 capsules (4 mg) daily. 70 capsule 3     traZODone (DESYREL) 50 MG tablet Take 1 tablet (50 mg) by mouth nightly as needed for sleep 90 tablet 3       ROS:     A 10-point review of systems was performed and is positive for that noted above in the HPI and as seen below.  All other areas are negative.     Numbness in feet?  no   Calf pain with walking? no  Recent foot/ankle injury? no  Weight change over past 12 months? no  Self perception as overweight? no  Recent flu-like symptoms? no  Joint pain other than feet ? no    Social History: Employment:  Customer service;  Exercise/Physical activity:  no;  Tobacco use:  yes  Social History     Socioeconomic History     Marital status:      Spouse name: Not on file     Number of children: Not on file     Years of education: Not on file     Highest education level: Not on file   Occupational History     Not on file   Social Needs     Financial resource strain: Not on file     Food insecurity     Worry: Not on file     Inability: Not on file     Transportation needs     Medical: Not on file     Non-medical: Not on file   Tobacco Use     Smoking status: Current Some Day Smoker     Packs/day: 0.30     Years: 30.00     Pack years: 9.00     Types: Cigarettes     Smokeless tobacco: Never Used     Tobacco comment: 1 cigarette every 2-3 weeks   Substance and Sexual Activity     Alcohol use: Yes     Comment: rare     Drug  "use: No     Sexual activity: Not on file   Lifestyle     Physical activity     Days per week: Not on file     Minutes per session: Not on file     Stress: Not on file   Relationships     Social connections     Talks on phone: Not on file     Gets together: Not on file     Attends Christian service: Not on file     Active member of club or organization: Not on file     Attends meetings of clubs or organizations: Not on file     Relationship status: Not on file     Intimate partner violence     Fear of current or ex partner: Not on file     Emotionally abused: Not on file     Physically abused: Not on file     Forced sexual activity: Not on file   Other Topics Concern     Parent/sibling w/ CABG, MI or angioplasty before 65F 55M? Not Asked   Social History Narrative     Not on file       Family history:  Family History   Problem Relation Age of Onset     Multiple Sclerosis Father      Diabetes Father      Hyperlipidemia Father        Rheumatoid arthritis:  no  Foot Problems: no  Diabetes: parent      EXAM:    Vitals: /70   Ht 1.753 m (5' 9\")   Wt 71.2 kg (157 lb)   BMI 23.18 kg/m    BMI: Body mass index is 23.18 kg/m .  Height: 5' 9\"    Constitutional/ general:  Pt is in no apparent distress, appears well-nourished.  Cooperative with history and physical exam.     Vascular:  Pedal pulses are palpable bilaterally for both the DP and PT arteries.  CFT < 3 sec.  No edema.  Pedal hair growth noted.     Neuro:  Alert and oriented x 3. Coordinated gait.  Light touch sensation is intact to the L4, L5, S1 distributions. No obvious deficits.  No evidence of neurological-based weakness, spasticity, or contracture in the lower extremities.     Derm: Normal texture and turgor.  No erythema, ecchymosis, or cyanosis.  No open lesions.   There is a nucleated hyperkeratotic lesion below the right fifth metatarsal head. This is painful.      Musculoskeletal:    Lower extremity muscle strength is normal.  Patient is ambulatory " without an assistive device or brace   No gross deformities.  High medial longitudinal arches.  The right fifth metatarsal head is very prominent.         Again, thank you for allowing me to participate in the care of your patient.        Sincerely,        Omar Stafford DPM

## 2020-03-18 NOTE — PATIENT INSTRUCTIONS
"Thank you for choosing [facility] Podiatry / Foot & Ankle Surgery!    DR. MAGANA'S CLINIC LOCATIONS     MONDAY - [place] WEDNESDAY (AM ONLY) - [place]   [address] [place] [address] [address]   [phone] / [phone] / [phone]       THURSDAY - [place] SCHEDULE SURGERY: [phone]   [address] APPOINTMENTS: [phone]   [place] [address] BILLING QUESTIONS: [phone] / [phone] RADIOLOGY: [phone]     Follow up: as needed    Next steps: stiffer soled shoes, cushioned insterts, check with insurance about \"routine foot care\", use a pumice stone    Please read through the following handouts and if you have any questions, please feel free to call us or send a [employer] message!      CALLUS / CORN / IPK CARE  When there is excessive friction or pressure on the skin, the body responds by making the skin thicker to protect the deeper structures from becoming exposed. While this works well to protect the deeper structures, the thickened skin can cause increased pressure and pain.    Callus: flat, diffuse thickened areas are simple calluses and they are usually caused by friction. Often these are the result of rubbing on a shoe or from going barefoot.    Corns: calluses with a central core on or between the toes are called corns. These result from prominent joints on toes rubbing together. Theses are a symptom of bone malalignment or illfitting shoes and will always recur unless the underlying bones are addressed surgically.    IPK: calluses with a central core on the ball of the foot are usually IPKs (intractable plantar keratosis). These are caused by excessive pressure from the metatarsals, the bones that make up the ball of the foot. Often one of these bones is too long or too prominent. Again, these will always recur unless the underlying bone issue is addressed. There is no cure for these. They will " either go away by themselves, recur, or more could develop.    TREATMENT RECOMENDATIONS  - File: Trim them down with a pumice stone or callus file a couple times a week to remove callus tissue that builds up. An electric callus removing device. Amope Pedi Perfect Electronic Pedicure Foot File and Callus Remover can be a good option.   - Moisturize: Lotion can be applied to soften the callus. A lactic acid or urea based cream such as Carmol, Kersal or Vanicream or thicker cream with shea butter are good options.   - Foot Gear: Good supportive shoes and minimizing barefoot walking can slow down callus formation and can decrease pain levels. Gel inserts can also provide padding to the bottom of the foot to prevent pain and slow recurrence. Toe spacers, toe covers, can custom orthotic inserts can be beneficial as well.  - Surgery: If there is a surgical pathology noted, such as a prominent bone, often this needs to be addressed surgically to minimize recurrence. However, sometimes the lesion simply migrates to another spot after surgery, so it is not a guaranteed cure.

## 2020-03-19 ENCOUNTER — TRANSFERRED RECORDS (OUTPATIENT)
Dept: HEALTH INFORMATION MANAGEMENT | Facility: CLINIC | Age: 60
End: 2020-03-19

## 2020-05-22 DIAGNOSIS — N40.1 BENIGN PROSTATIC HYPERPLASIA WITH URINARY FREQUENCY: ICD-10-CM

## 2020-05-22 DIAGNOSIS — R35.0 BENIGN PROSTATIC HYPERPLASIA WITH URINARY FREQUENCY: ICD-10-CM

## 2020-05-22 NOTE — TELEPHONE ENCOUNTER
[facility] called to verify what dose of Terazosin patient is taking. Patients wife answered and told [facility] that patient would not be back until around 8pm and. Left message for him to call back on [facility] direct extension.    x1 attempt.    Sundar Nayak at 1:21 PM on 5/22/2020  [facility] Health Guide  Phone [phone]

## 2020-05-22 NOTE — TELEPHONE ENCOUNTER
Routing refill request to provider for review/approval because:  Labs out of range:  CR    Conchis Oliva RN on 5/22/2020 at 11:55 AM

## 2020-05-22 NOTE — TELEPHONE ENCOUNTER
Please call patient and see what dose of Terazosin patient is taking. Once clarified please route to Dr. Ronquillo's basket.     Conchis Oliva RN on 5/22/2020 at 12:04 PM

## 2020-05-27 NOTE — TELEPHONE ENCOUNTER
[facility] called to verify what dose of Terazosin patient is taking. Patients wife answered and told [facility] patient was at work and wouldn't be back until after 1730.     CHG left message with wife asking patient to call back and leave message in [facility] voicemail stating how much Terazosin they were taking.    Sundar Nayak at 2:44 PM on 5/27/2020  [facility] Health Guide  Phone [phone]

## 2020-05-28 RX ORDER — TERAZOSIN 5 MG/1
5 CAPSULE ORAL AT BEDTIME
Qty: 90 CAPSULE | Refills: 3 | Status: SHIPPED | OUTPATIENT
Start: 2020-05-28 | End: 2021-03-31

## 2020-05-28 RX ORDER — TERAZOSIN 1 MG/1
CAPSULE ORAL
Qty: 210 CAPSULE | Refills: 1 | OUTPATIENT
Start: 2020-05-28

## 2020-05-28 NOTE — TELEPHONE ENCOUNTER
Placed call to patient. Patient's wife answered phone, stating patient is currently at work. Asked for another number to reach pt at, was advise there is not one. Asked patient's wife to have patient give a call back at later time. No C2C on file.    When patient calls back: Please relay Dr. Ronquillo note below.    Irina HENNING RN

## 2020-05-28 NOTE — TELEPHONE ENCOUNTER
If he was doing well at the four 1mg tabs (4mg) he should stay at this dose.   No need to start titration over.    I will actually send a 5mg dose to the pharmacy (this is the next increment it comes in).    Please tell patient he will be taking 5mg every day.    Delphine Ronquillo MD  Internal Medicine/Pediatrics  [facility]

## 2020-05-28 NOTE — TELEPHONE ENCOUNTER
Patient returned call and left message on [facility] voicemail.    Patient says he is taking medications as such    1 tab for 7 days; 2 tabs for 7 days; 3 tabs for 7 days; 4 tab for 7 days; then he go back to 1 tab a day for 7 days and works back to 4 tabs a day.    Patient says medication is helping and that he is completely out now.    Will rout back to EA refill with updated information.    Sundar Nayak at 9:13 AM on 5/28/2020  [facility] Health Guide  Phone [phone]

## 2020-05-28 NOTE — TELEPHONE ENCOUNTER
Please clarify how patient should be taking medication. Should he be taking 4 mg (4, 1 mg capsules) daily? It looks like patient was unclear on how to titrate up on medication.     Routing refill request to provider for review/approval because:  Labs not current:  SRIRAM Oliva RN on 5/28/2020 at 9:34 AM

## 2020-06-01 ENCOUNTER — TELEPHONE (OUTPATIENT)
Dept: PEDIATRICS | Facility: CLINIC | Age: 60
End: 2020-06-01

## 2020-06-01 NOTE — TELEPHONE ENCOUNTER
"CHG returning patient calls. CHG informs patient PCP has written new Rx for 5mg of Terazosin once daily.    Patient then informs CHG that their father is then a patient of  and says that he has some concerns about his father.     Says that they used to have a home nurse come by to check on fathers catheter, but that stopped in January and the bag is being held in place by duck tap and their fathers urin is a dark tan and has a very \"pungent\" smell.    Is asking if  can write orders for someone to come out to the house to change the catheter.    Note will be placed in fathers chart.    Sundar Nayak at 1:32 PM on 6/1/2020  [facility] Health Guide  Phone [phone]    "

## 2020-06-01 NOTE — TELEPHONE ENCOUNTER
Spoke to patient.     Patient xpressed understanding and acceptance of the plan and had no further questions at this time.  Advised can call back to clinic at any time with concerns.     Adela Ocampo RN Flex

## 2020-07-21 ENCOUNTER — TRANSFERRED RECORDS (OUTPATIENT)
Dept: HEALTH INFORMATION MANAGEMENT | Facility: CLINIC | Age: 60
End: 2020-07-21

## 2020-12-16 DIAGNOSIS — F51.01 PRIMARY INSOMNIA: ICD-10-CM

## 2020-12-16 RX ORDER — TRAZODONE HYDROCHLORIDE 50 MG/1
50 TABLET, FILM COATED ORAL
Qty: 90 TABLET | Refills: 3 | Status: SHIPPED | OUTPATIENT
Start: 2020-12-16 | End: 2021-03-31

## 2020-12-16 NOTE — TELEPHONE ENCOUNTER
Patient called requesting refill of traZODone. Orders pended for provider review. Physical also scheduled for 03/31/2021 at 1545.    Sundar Nayak at 10:04 AM on 12/16/2020  [facility] Health Guide  Phone [phone]

## 2021-03-16 ENCOUNTER — TELEPHONE (OUTPATIENT)
Dept: PEDIATRICS | Facility: CLINIC | Age: 61
End: 2021-03-16

## 2021-03-16 NOTE — TELEPHONE ENCOUNTER
Patient called [facility] extension requesting assistance with finding someplace to get the COVID Vaccine. Patient is ineligible to get the vaccine from [facility] at time of writing. [facility] provided patient with number for state/county locations doing COVID vaccines.    Sundar Nayak at 4:01 PM on 3/16/2021  [facility] Health Guide  Phone [phone]

## 2021-03-17 ENCOUNTER — TELEPHONE (OUTPATIENT)
Dept: PEDIATRICS | Facility: CLINIC | Age: 61
End: 2021-03-17

## 2021-03-17 NOTE — TELEPHONE ENCOUNTER
Patient called and left generic message requesting call back.     Upon calling back patient informs CHG that they tried to get COVID vaccine through [facility] and found out that people are waiting for several hours and they are not able to wait that long with work and taking care of their father.     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    Sundar Nayak at 1:19 PM on 3/17/2021  [facility] Health Guide  Phone [phone]

## 2021-03-24 ENCOUNTER — IMMUNIZATION (OUTPATIENT)
Dept: NURSING | Facility: CLINIC | Age: 61
End: 2021-03-24
Payer: COMMERCIAL

## 2021-03-24 PROCEDURE — 0001A PR COVID VAC PFIZER DIL RECON 30 MCG/0.3 ML IM: CPT

## 2021-03-24 PROCEDURE — 91300 PR COVID VAC PFIZER DIL RECON 30 MCG/0.3 ML IM: CPT

## 2021-03-30 NOTE — PROGRESS NOTES
SUBJECTIVE:   CC: Hermelindo Conley is an 61 year old male who presents for preventative health visit.     Split Bill scripting  The purpose of this visit is to discuss your medical history and prevent health problems before you are sick. You may be responsible for a co-pay, coinsurance, or deductible if your visit today includes services such as checking on a sore throat, having an x-ray or lab test, or treating and evaluating a new or existing condition :[id number]}  Patient has been advised of split billing requirements and indicates understanding: Yes     Healthy Habits:     Getting at least 3 servings of Calcium per day:  NO    Bi-annual eye exam:  NO    Dental care twice a year:  NO    Sleep apnea or symptoms of sleep apnea:  Daytime drowsiness    Diet:  Regular (no restrictions)    Frequency of exercise:  None    Taking medications regularly:  Yes    Medication side effects:  None    PHQ-2 Total Score: 0    Additional concerns today:  No      Today's PHQ-2 Score:   PHQ-2 ( 1999 Pfizer) 3/31/2021   Q1: Little interest or pleasure in doing things 0   Q2: Feeling down, depressed or hopeless 0   PHQ-2 Score 0   Q1: Little interest or pleasure in doing things Not at all   Q2: Feeling down, depressed or hopeless Not at all   PHQ-2 Score 0       Abuse: Current or Past(Physical, Sexual or Emotional)- No  Do you feel safe in your environment? Yes    Have you ever done Advance Care Planning? (For example, a Health Directive, POLST, or a discussion with a medical provider or your loved ones about your wishes): Yes, patient states has an Advance Care Planning document and will bring a copy to the clinic.    Social History     Tobacco Use     Smoking status: Current Some Day Smoker     Packs/day: 0.30     Years: 30.00     Pack years: 9.00     Types: Cigarettes     Smokeless tobacco: Never Used     Tobacco comment: 1 cigarette every 2-3 weeks   Substance Use Topics     Alcohol use: Yes     Comment: rare     If you drink alcohol  "do you typically have >3 drinks per day or >7 drinks per week? No    Alcohol Use 3/31/2021   Prescreen: >3 drinks/day or >7 drinks/week? No   Prescreen: >3 drinks/day or >7 drinks/week? -   AUDIT SCORE  -       Last PSA:   PSA   Date Value Ref Range Status   01/29/2020 0.92 0 - 4 ug/L Final     Comment:     Assay Method:  Chemiluminescence using Siemens Vista analyzer       Reviewed orders with patient. Reviewed health maintenance and updated orders accordingly - Yes  BP Readings from Last 3 Encounters:   03/31/21 130/70   03/18/20 118/70   01/29/20 128/80    Wt Readings from Last 3 Encounters:   03/31/21 66.5 kg (146 lb 8 oz)   03/18/20 71.2 kg (157 lb)   01/29/20 66.5 kg (146 lb 11.2 oz)                    Reviewed and updated as needed this visit by clinical staff                 Reviewed and updated as needed this visit by Provider                    Review of Systems   Constitutional: Negative for chills and fever.   HENT: Negative for congestion, ear pain, hearing loss and sore throat.    Eyes: Positive for visual disturbance. Negative for pain.   Respiratory: Negative for cough and shortness of breath.    Cardiovascular: Negative for chest pain, palpitations and peripheral edema.   Gastrointestinal: Negative for abdominal pain, constipation, diarrhea, heartburn, hematochezia and nausea.   Genitourinary: Positive for frequency and urgency. Negative for discharge, dysuria, genital sores, hematuria and impotence.   Musculoskeletal: Positive for arthralgias. Negative for joint swelling and myalgias.   Skin: Negative for rash.   Neurological: Positive for weakness and headaches. Negative for dizziness and paresthesias.   Psychiatric/Behavioral: Negative for mood changes. The patient is not nervous/anxious.          OBJECTIVE:   /70   Pulse 70   Temp 96.8  F (36  C) (Tympanic)   Resp 16   Ht 1.738 m (5' 8.43\")   Wt 66.5 kg (146 lb 8 oz)   SpO2 98%   BMI 22.00 kg/m      Physical Exam  GENERAL: healthy, " "alert and no distress  EYES: Eyes grossly normal to inspection, PERRL and conjunctivae and sclerae normal  HENT: ear canals and TM's normal, nose and mouth without ulcers or lesions  NECK: no adenopathy, no asymmetry, masses, or scars and thyroid normal to palpation  RESP: lungs clear to auscultation - no rales, rhonchi or wheezes  CV: regular rate and rhythm, normal S1 S2, no S3 or S4, no murmur, click or rub, no peripheral edema and peripheral pulses strong  ABDOMEN: soft, nontender, no hepatosplenomegaly, no masses and bowel sounds normal  MS: no gross musculoskeletal defects noted, no edema  SKIN: no suspicious lesions or rashes  NEURO: Normal strength and tone, mentation intact and speech normal  PSYCH: mentation appears normal, affect normal/bright    Diagnostic Test Results:  Labs reviewed in Epic    ASSESSMENT/PLAN:   1. Routine general medical examination at a health care facility  - REVIEW OF HEALTH MAINTENANCE PROTOCOL ORDERS    2. Primary insomnia  Uses mostly 2 tabs per night.  - traZODone (DESYREL) 50 MG tablet; Take 1-2 tablets ( mg) by mouth nightly as needed for sleep  Dispense: 180 tablet; Refill: 3    3. Benign prostatic hyperplasia with urinary frequency  Trial this with fluid cut off 4 hours prior to bedtime.  If not helping consider urology referral in the future.   - tamsulosin (FLOMAX) 0.4 MG capsule; Take 1 capsule (0.4 mg) by mouth daily  Dispense: 90 capsule; Refill: 3  - PSA, screen    Patient has been advised of split billing requirements and indicates understanding: Yes  COUNSELING:   Reviewed preventive health counseling, as reflected in patient instructions    Estimated body mass index is 22 kg/m  as calculated from the following:    Height as of this encounter: 1.738 m (5' 8.43\").    Weight as of this encounter: 66.5 kg (146 lb 8 oz).       He reports that he has been smoking cigarettes. He has a 9.00 pack-year smoking history. He has never used smokeless " tobacco.        Counseling Resources:  ATP IV Guidelines  Pooled Cohorts Equation Calculator  FRAX Risk Assessment  ICSI Preventive Guidelines  Dietary Guidelines for Americans, 2010  USDA's MyPlate  ASA Prophylaxis  Lung CA Screening    Delphine Ronquillo MD  [place] [address]

## 2021-03-31 ENCOUNTER — OFFICE VISIT (OUTPATIENT)
Dept: PEDIATRICS | Facility: CLINIC | Age: 61
End: 2021-03-31
Payer: COMMERCIAL

## 2021-03-31 VITALS
HEIGHT: 68 IN | SYSTOLIC BLOOD PRESSURE: 130 MMHG | WEIGHT: 146.5 LBS | OXYGEN SATURATION: 98 % | DIASTOLIC BLOOD PRESSURE: 70 MMHG | TEMPERATURE: 96.8 F | BODY MASS INDEX: 22.2 KG/M2 | HEART RATE: 70 BPM | RESPIRATION RATE: 16 BRPM

## 2021-03-31 DIAGNOSIS — F51.01 PRIMARY INSOMNIA: ICD-10-CM

## 2021-03-31 DIAGNOSIS — N40.1 BENIGN PROSTATIC HYPERPLASIA WITH URINARY FREQUENCY: ICD-10-CM

## 2021-03-31 DIAGNOSIS — R35.0 BENIGN PROSTATIC HYPERPLASIA WITH URINARY FREQUENCY: ICD-10-CM

## 2021-03-31 DIAGNOSIS — Z00.00 ROUTINE GENERAL MEDICAL EXAMINATION AT A HEALTH CARE FACILITY: Primary | ICD-10-CM

## 2021-03-31 PROCEDURE — 36415 COLL VENOUS BLD VENIPUNCTURE: CPT | Performed by: PEDIATRICS

## 2021-03-31 PROCEDURE — 99213 OFFICE O/P EST LOW 20 MIN: CPT | Mod: 25 | Performed by: PEDIATRICS

## 2021-03-31 PROCEDURE — 99396 PREV VISIT EST AGE 40-64: CPT | Performed by: PEDIATRICS

## 2021-03-31 PROCEDURE — G0103 PSA SCREENING: HCPCS | Performed by: PEDIATRICS

## 2021-03-31 RX ORDER — TAMSULOSIN HYDROCHLORIDE 0.4 MG/1
0.4 CAPSULE ORAL DAILY
Qty: 90 CAPSULE | Refills: 3 | Status: SHIPPED | OUTPATIENT
Start: 2021-03-31 | End: 2023-02-16

## 2021-03-31 RX ORDER — TRAZODONE HYDROCHLORIDE 50 MG/1
50-100 TABLET, FILM COATED ORAL
Qty: 180 TABLET | Refills: 3 | Status: SHIPPED | OUTPATIENT
Start: 2021-03-31 | End: 2022-04-07

## 2021-03-31 ASSESSMENT — ENCOUNTER SYMPTOMS
DIARRHEA: 0
COUGH: 0
CONSTIPATION: 0
FREQUENCY: 1
EYE PAIN: 0
CHILLS: 0
HEMATURIA: 0
HEADACHES: 1
DIZZINESS: 0
NAUSEA: 0
HEMATOCHEZIA: 0
HEARTBURN: 0
ARTHRALGIAS: 1
ABDOMINAL PAIN: 0
PALPITATIONS: 0
JOINT SWELLING: 0
MYALGIAS: 0
SORE THROAT: 0
NERVOUS/ANXIOUS: 0
PARESTHESIAS: 0
DYSURIA: 0
WEAKNESS: 1
SHORTNESS OF BREATH: 0
FEVER: 0

## 2021-03-31 ASSESSMENT — MIFFLIN-ST. JEOR: SCORE: 1445.77

## 2021-03-31 NOTE — LETTER
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    Please call me with any questions or concerns.    Sincerely,          Delphine Ronquillo MD  Internal Medicine/Pediatrics  [facility]

## 2021-04-01 LAB — PSA SERPL-ACNC: 1.24 UG/L (ref 0–4)

## 2021-04-14 ENCOUNTER — OFFICE VISIT (OUTPATIENT)
Dept: NURSING | Facility: CLINIC | Age: 61
End: 2021-04-14
Attending: INTERNAL MEDICINE
Payer: COMMERCIAL

## 2021-04-14 PROCEDURE — 91300 PR COVID VAC PFIZER DIL RECON 30 MCG/0.3 ML IM: CPT

## 2021-04-14 PROCEDURE — 0002A PR COVID VAC PFIZER DIL RECON 30 MCG/0.3 ML IM: CPT

## 2021-06-17 DIAGNOSIS — N40.1 BENIGN PROSTATIC HYPERPLASIA WITH URINARY FREQUENCY: Primary | ICD-10-CM

## 2021-06-17 DIAGNOSIS — R35.0 BENIGN PROSTATIC HYPERPLASIA WITH URINARY FREQUENCY: Primary | ICD-10-CM

## 2021-06-17 RX ORDER — TERAZOSIN 5 MG/1
5 CAPSULE ORAL AT BEDTIME
Qty: 90 CAPSULE | Refills: 1 | Status: SHIPPED | OUTPATIENT
Start: 2021-06-17 | End: 2021-12-20

## 2021-06-17 NOTE — TELEPHONE ENCOUNTER
Hermelindo is out of this prescription. He says it is helpful and he does sleep better but he still gets up a couple times to go to the bathroom. Was not sure if you wanted him to continue with it.  Pended it if you want him to continue.    TERAZOSIN 5 MG    Dad is doing better possible release this weekend.    Tova Tavares EMT 1:12 PM on June 17, 2021  [facility] Health Guide  [phone]

## 2021-12-17 DIAGNOSIS — R35.0 BENIGN PROSTATIC HYPERPLASIA WITH URINARY FREQUENCY: ICD-10-CM

## 2021-12-17 DIAGNOSIS — N40.1 BENIGN PROSTATIC HYPERPLASIA WITH URINARY FREQUENCY: ICD-10-CM

## 2021-12-20 RX ORDER — TERAZOSIN 5 MG/1
CAPSULE ORAL
Qty: 90 CAPSULE | Refills: 1 | Status: SHIPPED | OUTPATIENT
Start: 2021-12-20 | End: 2022-09-21

## 2021-12-20 NOTE — TELEPHONE ENCOUNTER
Routing refill request to provider for review/approval because:  Antiadrenergic Antihypertensives Failed 12/17/2021 01:23 AM   Protocol Details  Blood pressure less than 140/90 in past 6 months    Normal serum creatinine on file in past 12 months    Recent (6 mo) or future (30 days) visit within the authorizing provider's specialty     Maliha Gabriel RN

## 2022-04-07 DIAGNOSIS — F51.01 PRIMARY INSOMNIA: ICD-10-CM

## 2022-04-07 RX ORDER — TRAZODONE HYDROCHLORIDE 50 MG/1
50-100 TABLET, FILM COATED ORAL
Qty: 180 TABLET | Refills: 0 | Status: SHIPPED | OUTPATIENT
Start: 2022-04-07 | End: 2022-07-06

## 2022-04-07 NOTE — TELEPHONE ENCOUNTER
Ana burgos  Pt is due for an office visit  Routing to MA/IDRIS to schedule an appt  Manuel Ortiz RN on 4/7/2022 at 3:29 PM

## 2022-04-08 ENCOUNTER — TELEPHONE (OUTPATIENT)
Dept: PEDIATRICS | Facility: CLINIC | Age: 62
End: 2022-04-08
Payer: COMMERCIAL

## 2022-04-08 NOTE — TELEPHONE ENCOUNTER
Talked with pt he is not needing this medication now. Must be on auto refill. Did schedule pt for a physical. Ending encounter.    Sammi Lewis, EMT at 8:55 AM on April 8, 2022  [facility] Health Guide  [phone]

## 2022-07-01 DIAGNOSIS — F51.01 PRIMARY INSOMNIA: ICD-10-CM

## 2022-07-06 RX ORDER — TRAZODONE HYDROCHLORIDE 50 MG/1
50-100 TABLET, FILM COATED ORAL
Qty: 180 TABLET | Refills: 0 | Status: SHIPPED | OUTPATIENT
Start: 2022-07-06 | End: 2022-10-04

## 2022-07-06 NOTE — TELEPHONE ENCOUNTER
Routing refill request to provider for review/approval because:  Ana given x1 and patient did not follow up, please advise  Patient needs to be seen because it has been more than 1 year since last office visit.  Pt has appointment 8/5, ok to extend again?  Lavonne Chaney RN, BSN  [facility]

## 2022-08-01 ENCOUNTER — TELEPHONE (OUTPATIENT)
Dept: PEDIATRICS | Facility: CLINIC | Age: 62
End: 2022-08-01

## 2022-08-01 NOTE — TELEPHONE ENCOUNTER
Pt wanted to know when apt was. Pt was informed. Caught up as I have not talked to him since his dad passed away.     Sammi Lewis, EMT at 10:53 AM on August 1, 2022  [facility] Health Guide  [phone]

## 2022-08-02 ENCOUNTER — TELEPHONE (OUTPATIENT)
Dept: PEDIATRICS | Facility: CLINIC | Age: 62
End: 2022-08-02

## 2022-08-03 ENCOUNTER — TELEPHONE (OUTPATIENT)
Dept: PEDIATRICS | Facility: CLINIC | Age: 62
End: 2022-08-03

## 2022-08-03 NOTE — TELEPHONE ENCOUNTER
Pt wants to be set up for [facility].    Sammi Lewis, EMT at 2:03 PM on August 3, 2022  [facility] Health Guide  [phone]

## 2022-09-03 ENCOUNTER — HEALTH MAINTENANCE LETTER (OUTPATIENT)
Age: 62
End: 2022-09-03

## 2022-09-19 DIAGNOSIS — N40.1 BENIGN PROSTATIC HYPERPLASIA WITH URINARY FREQUENCY: ICD-10-CM

## 2022-09-19 DIAGNOSIS — R35.0 BENIGN PROSTATIC HYPERPLASIA WITH URINARY FREQUENCY: ICD-10-CM

## 2022-09-20 NOTE — TELEPHONE ENCOUNTER
Routing refill request to provider for review/approval because:  A break in medication-last rx sent 12/20/21 x 6 months  Patient needs to be seen because it has been more than 1 year since last office visit-LAST VISIT MARCH 2021  PT scheduled appointment next year, Feb 2023, confirm,  E visit now or virtual visit?  BP Readings from Last 2 Encounters:   03/31/21 130/70   03/18/20 118/70     Lab Results   Component Value Date    CR 0.61 01/29/2020     Lab Results   Component Value Date    POTASSIUM 3.7 01/29/2020     Lavonne Chaney, RN, BSN  [facility]

## 2022-09-21 RX ORDER — TERAZOSIN 5 MG/1
CAPSULE ORAL
Qty: 90 CAPSULE | Refills: 1 | Status: SHIPPED | OUTPATIENT
Start: 2022-09-21 | End: 2023-02-16

## 2022-10-02 DIAGNOSIS — F51.01 PRIMARY INSOMNIA: ICD-10-CM

## 2022-10-04 RX ORDER — TRAZODONE HYDROCHLORIDE 50 MG/1
50-100 TABLET, FILM COATED ORAL
Qty: 180 TABLET | Refills: 1 | Status: SHIPPED | OUTPATIENT
Start: 2022-10-04 | End: 2023-03-08

## 2022-10-04 NOTE — TELEPHONE ENCOUNTER
Routing refill request to provider for review/approval because:  Patient needs to be seen because it has been more than 1 year since last office visit.    Patient has appointment in four months    Maliha Gabriel RN

## 2022-11-03 ENCOUNTER — TELEPHONE (OUTPATIENT)
Dept: PEDIATRICS | Facility: CLINIC | Age: 62
End: 2022-11-03

## 2022-11-03 NOTE — TELEPHONE ENCOUNTER
Outreach pt is doing well. Does not have any concerns at this time. Has a physical scheduled already.     Sammi Lewis, EMT at 10:52 AM on November 3, 2022  [facility] Health Guide  [phone]

## 2022-11-16 ENCOUNTER — TRANSFERRED RECORDS (OUTPATIENT)
Dept: HEALTH INFORMATION MANAGEMENT | Facility: CLINIC | Age: 62
End: 2022-11-16

## 2022-12-06 ENCOUNTER — TRANSCRIBE ORDERS (OUTPATIENT)
Dept: OTHER | Age: 62
End: 2022-12-06

## 2022-12-06 DIAGNOSIS — H47.20 OPTIC ATROPHY OF BOTH EYES: Primary | ICD-10-CM

## 2022-12-15 ENCOUNTER — TELEPHONE (OUTPATIENT)
Dept: PEDIATRICS | Facility: CLINIC | Age: 62
End: 2022-12-15

## 2023-01-11 ENCOUNTER — OFFICE VISIT (OUTPATIENT)
Dept: OPHTHALMOLOGY | Facility: CLINIC | Age: 63
End: 2023-01-11
Attending: OPHTHALMOLOGY
Payer: COMMERCIAL

## 2023-01-11 DIAGNOSIS — H53.10 SUBJECTIVE VISUAL DISTURBANCE: ICD-10-CM

## 2023-01-11 DIAGNOSIS — H47.20 OPTIC ATROPHY OF BOTH EYES: ICD-10-CM

## 2023-01-11 DIAGNOSIS — H47.213 PRIMARY OPTIC ATROPHY OF BOTH EYES: Primary | ICD-10-CM

## 2023-01-11 PROCEDURE — 99204 OFFICE O/P NEW MOD 45 MIN: CPT | Mod: GC | Performed by: OPHTHALMOLOGY

## 2023-01-11 PROCEDURE — 92133 CPTRZD OPH DX IMG PST SGM ON: CPT | Performed by: OPHTHALMOLOGY

## 2023-01-11 PROCEDURE — G0463 HOSPITAL OUTPT CLINIC VISIT: HCPCS | Mod: 25

## 2023-01-11 ASSESSMENT — CONF VISUAL FIELD
OS_INFERIOR_NASAL_RESTRICTION: 0
OD_INFERIOR_NASAL_RESTRICTION: 0
OD_NORMAL: 1
METHOD: COUNTING FINGERS
OS_NORMAL: 1
OD_INFERIOR_TEMPORAL_RESTRICTION: 0
OD_SUPERIOR_TEMPORAL_RESTRICTION: 0
OS_SUPERIOR_TEMPORAL_RESTRICTION: 0
OS_INFERIOR_TEMPORAL_RESTRICTION: 0
OS_SUPERIOR_NASAL_RESTRICTION: 0
OD_SUPERIOR_NASAL_RESTRICTION: 0

## 2023-01-11 ASSESSMENT — VISUAL ACUITY
OD_CC+: -2
METHOD: SNELLEN - LINEAR
OS_CC+: 2
OD_CC: 20/40
OS_CC: 20/40
CORRECTION_TYPE: GLASSES

## 2023-01-11 ASSESSMENT — REFRACTION_WEARINGRX
OD_SPHERE: -2.00
OD_AXIS: 176
OS_SPHERE: -1.25
OD_CYLINDER: +0.50
OD_ADD: +2.50
SPECS_TYPE: PAL
OS_CYLINDER: +0.50
OS_AXIS: 162
OS_ADD: +2.50

## 2023-01-11 ASSESSMENT — TONOMETRY
OS_IOP_MMHG: 16
OD_IOP_MMHG: 15
IOP_METHOD: ICARE

## 2023-01-11 ASSESSMENT — SLIT LAMP EXAM - LIDS
COMMENTS: NORMAL
COMMENTS: NORMAL

## 2023-01-11 ASSESSMENT — EXTERNAL EXAM - LEFT EYE: OS_EXAM: NORMAL

## 2023-01-11 ASSESSMENT — EXTERNAL EXAM - RIGHT EYE: OD_EXAM: NORMAL

## 2023-01-11 NOTE — LETTER
2023    RE: Hermelindo Conley  : 1960  MRN: [id number]    Dear Dr. Sandhu,    Thank you for referring your patient, Hermelindo Conley, to my neuro-ophthalmology clinic recently.  After a thorough neuro-ophthalmic history and examination, I came to the following conclusions:     1. Bilateral symmetric optic atrophy     This has been slowly progressive for many years. Differential is broad including nutritional/toxic injury. He does have longtime smoking history which can cause optic nerve damage. Pattern of vision loss and temporal optic atrophy most suggestive of mitochondrial optic neuropathy such as seen with nutritional optic neuropathy, dominant optic atrophy, matilda hereditary optic neuropathy, or toxic (e.g. smoking). Other possibilities would be a mass or compressive structural etiology in the brain although this is less likely. Optic nerve damage related to demyelinating disease is plausible but he does not have other clinical symptoms to fit with this diagnosis although his father had a diagnosis of MS. We did discuss genetic testing in the future and he would prefer to hold off.     Plan  - Will rule out compressive lesion with MRI brain and orbit with/wo contrast.  - Will send for optic atrophy labs: B12/MMA/homocysteine, B1, folate, FTA-Antibodies.   - Follow-up in 4 months, sooner depending on the results of the workup.  - Consider dominant optic atrophy testing in the future although patient declines now as he recognizes there is no treatment.    MRI brain and orbits with and without IV contrast   Follow-up in 6 months    Hermelindo Conley is a pleasant 62 year old White male who presents to my neuro-ophthalmology clinic today having been referred by Dr. Sandhu for optic atrophy each eye.     HPI: Pt has PMHx of basal cell carcinoma, BPH, tobacco use disorder and unremarkable POHx who was referred by Dr. Sandhu after routine annual evaluation 2022.     He does not report and recent significant  change to his vision. He is reading okay with his bifocals. He does have to get an eye exam prior to being approved for a drivers license. He continues to drive with no difficulty. He has difficulty with color vision. He is unable to see the color of stop lights from a distance. This has been something he's noticed for about 10 years.      Patient reports no prior history of vision loss in one or both eyes. No history of diplopia, numbness/tingling, weakness, or balance difficulty. No tingling in hands or feet except ulnar nerve iniury he reports on left due to falling off ladder.    He did have a skull fracture when falling off of a ladder in 2011.     His father had multiple sclerosis.  He did lose his drivers license due to vision problems.    Independent historians: Patient and chart review    Review of outside testing: None  My interpretation performed today of outside testing: Not applicable    Review of outside clinical notes:  -Visit with Dr. Luis Eduardo Sandhu 11/16/2022 (limited note)    Past medical history:  Patient Active Problem List   Diagnosis     Tobacco abuse     History of malignant neoplasm of skin     History of basal cell carcinoma     Primary insomnia     Patient has a current medication list which includes the following prescription(s): tamsulosin, terazosin, and trazodone.    Family history / social history:  Patient's family history includes Diabetes in his father; Hyperlipidemia in his father; Multiple Sclerosis in his father.   Patient  reports that he has been smoking cigarettes. He has a 9.00 pack-year smoking history. He has never used smokeless tobacco. He reports current alcohol use. He reports that he does not use drugs.     Smokes 1/2 ppd currently, has smoked for roughly 45 years.   He does not drink currently and has no history of heavy drinking (could not drink more than 1/2 serving even in college).    Exam:  Distance corrected visual acuity was 20/40-2 right eye and 20/40-2 left eye.    Intraocular pressure (in mmHg) was 15 right eye and 16 left eye by [employer] tonometry.   Pupils were isocoric without a relative afferent pupillary defect in either eye.   Color plates were equally depressed at 1/11 both eyes.  Confrontational visual fields were full right eye, full left eye.    Sensorimotor exam showed full motility in both eyes.    Slit lamp exam unremarkable bilaterally. Bilateral optic nerve pallor which is diffuse but more prominent temporally.    Tests ordered and interpreted today:    Patient unable to perform visual field testing.       OCT with optic nerve thinning worse in temporal fields bilaterally and more prominent in the right eye.       Again, thank you for trusting me with the care of your patient.  For further exam details, please feel free to contact our office for additional records.  If you wish to contact me regarding this patient please email me at [email or web address] or give my clinic a call to arrange a phone conversation.    Sincerely,    Arpit Boyd MD  , Neuro-Ophthalmology and Adult Strabismus Surgery  The [employer] NAPIER and Daina Merlos Chair in Neuro-Ophthalmology  Department of Ophthalmology and Visual Neurosciences  [facility]    DX: optic atrophy

## 2023-01-11 NOTE — NURSING NOTE
Chief Complaints and History of Present Illnesses   Patient presents with     Consult For     Primary optic atrophy of both eyes referred by Luis Eduardo Sandhu, OD     Chief Complaint(s) and History of Present Illness(es)     Consult For            Laterality: both eyes    Context: night driving    Course: stable    Associated symptoms: abnormal color vision.  Negative for glare, haloes, eye pain, headache and photophobia    Treatments tried: no treatments    Pain scale: 0/10    Comments: Primary optic atrophy of both eyes referred by Luis Eduardo Sandhu, OD          Comments    Patient states that he has been having trouble seeing shades of colors for the past 5-6 years.  Light yellow and pink appears white to him.  Dark navy looks black to him.  Froma distance he is unable to tell the color of the traffic light.  Once he is close to the signal he can tell which position has a light shining.    Conchis Hanley, COT 12:35 PM  January 11, 2023

## 2023-01-11 NOTE — PROGRESS NOTES
1. Bilateral symmetric optic atrophy     This has been slowly progressive for many years. Differential is broad including nutritional/toxic injury. He does have longtime smoking history which can cause optic nerve damage. Pattern of vision loss and temporal optic atrophy most suggestive of mitochondrial optic neuropathy such as seen with nutritional optic neuropathy, dominant optic atrophy, matilda hereditary optic neuropathy, or toxic (e.g. smoking). Other possibilities would be a mass or compressive structural etiology in the brain although this is less likely. Optic nerve damage related to demyelinating disease is plausible but he does not have other clinical symptoms to fit with this diagnosis although his father had a diagnosis of MS. We did discuss genetic testing in the future and he would prefer to hold off.     Plan  - Will rule out compressive lesion with MRI brain and orbit with/wo contrast  - Will send for optic atrophy labs: B12/MMA/homocysteine, B1, folate, FTA-Antibodies.   - Follow-up in 4 months, sooner depending on the results of the workup  - Consider dominant optic atrophy testing in the future although patient declines now as he recognizes there is no treatment.    MRI brain and orbits with and without IV contrast   Follow-up in 6 months    Hermelindo Conley is a pleasant 62 year old White male who presents to my neuro-ophthalmology clinic today having been referred by Dr. Sandhu for optic atrophy each eye.     HPI: Pt has PMHx of basal cell carcinoma, BPH, tobacco use disorder and unremarkable POHx who was referred by Dr. Sandhu after routine annual evaluation 11/16/2022.     He does not report and recent significant change to his vision. He is reading okay with his bifocals. He does have to get an eye exam prior to being approved for a drivers license. He continues to drive with no difficulty. He has difficulty with color vision. He is unable to see the color of stop lights from a distance. This has  been something he's noticed for about 10 years.      Patient reports no prior history of vision loss in one or both eyes. No history of diplopia, numbness/tingling, weakness, or balance difficulty. No tingling in hands or feet except ulnar nerve iniury he reports on left due to falling off ladder.    He did have a skull fracture when falling off of a ladder in 2011.     His father had multiple sclerosis.  He did lose his drivers license due to vision problems.    Independent historians: Patient and chart review    Review of outside testing: None  My interpretation performed today of outside testing:  Not applicable    Review of outside clinical notes:  -Visit with Dr. Luis Eduardo Sandhu 11/16/2022 (limited note)    Past medical history:  Patient Active Problem List   Diagnosis     Tobacco abuse     History of malignant neoplasm of skin     History of basal cell carcinoma     Primary insomnia     Patient has a current medication list which includes the following prescription(s): tamsulosin, terazosin, and trazodone.    Family history / social history:  Patient's family history includes Diabetes in his father; Hyperlipidemia in his father; Multiple Sclerosis in his father.   Patient  reports that he has been smoking cigarettes. He has a 9.00 pack-year smoking history. He has never used smokeless tobacco. He reports current alcohol use. He reports that he does not use drugs.     Smokes 1/2 ppd currently, has smoked for roughly 45 years.   He does not drink currently and has no history of heavy drinking (could not drink more than 1/2 serving even in college.)     Exam:  Distance corrected visual acuity was 20/40-2 right eye and 20/40-2 left eye.   Intraocular pressure (in mmHg) was 15 right eye and 16 left eye by [employer] tonometry.   Pupils were isocoric without a relative afferent pupillary defect in either eye.   Color plates were equally depressed at 1/11 both eyes.  Confrontational visual fields were full right eye, full  left eye.    Sensorimotor exam showed full motility in both eyes.    Slit lamp exam unremarkable bilaterally. Bilateral optic nerve pallor which is diffuse but more prominent temporally.    Tests ordered and interpreted today:    Patient unable to perform visual field testing.       OCT with optic nerve thinning worse in temporal fields bilaterally and more prominent in the right eye.          45 minutes were spent on the date of the encounter by me doing chart review, history and exam, documentation, and further activities as noted above    Complete documentation of historical and exam elements from today's encounter can be found in the full encounter summary report (not reduplicated in this progress note).  I personally obtained the chief complaint(s) and history of present illness.  I confirmed and edited as necessary the review of systems, past medical/surgical history, family history, social history, and examination findings as documented by others; and I examined the patient myself.  I personally reviewed the relevant tests, images, and reports as documented above.  I formulated and edited as necessary the assessment and plan and discussed the findings and management plan with the patient and family.  I personally reviewed the ophthalmic test(s) associated with this encounter, agree with the interpretation(s) as documented by the resident/fellow, and have edited the corresponding report(s) as necessary.     MD Bernice Millard DO  Neuroimmunology fellow PGY 5      Precharting:  Genaro Naqvi MD, MSc  Ophthalmology Resident PGY3

## 2023-01-27 ENCOUNTER — HOSPITAL ENCOUNTER (OUTPATIENT)
Dept: MRI IMAGING | Facility: CLINIC | Age: 63
Discharge: HOME | End: 2023-01-27
Attending: OPHTHALMOLOGY
Payer: COMMERCIAL

## 2023-01-27 DIAGNOSIS — H47.213 PRIMARY OPTIC ATROPHY OF BOTH EYES: ICD-10-CM

## 2023-01-27 PROCEDURE — A9585 GADOBUTROL INJECTION: HCPCS | Performed by: OPHTHALMOLOGY

## 2023-01-27 PROCEDURE — 70543 MRI ORBT/FAC/NCK W/O &W/DYE: CPT

## 2023-01-27 PROCEDURE — 255N000002 HC RX 255 OP 636: Performed by: OPHTHALMOLOGY

## 2023-01-27 PROCEDURE — 70553 MRI BRAIN STEM W/O & W/DYE: CPT

## 2023-01-27 RX ORDER — GADOBUTROL 604.72 MG/ML
6.5 INJECTION INTRAVENOUS ONCE
Status: COMPLETED | OUTPATIENT
Start: 2023-01-27 | End: 2023-01-27

## 2023-01-27 RX ADMIN — GADOBUTROL 6.5 ML: 604.72 INJECTION INTRAVENOUS at 12:17

## 2023-01-30 ENCOUNTER — TELEPHONE (OUTPATIENT)
Dept: PEDIATRICS | Facility: CLINIC | Age: 63
End: 2023-01-30
Payer: COMMERCIAL

## 2023-01-30 NOTE — TELEPHONE ENCOUNTER
Pt called he is having some optic nerve damage issues.  from [facility] has ordered lab and he has recently done MRI. Lab apt made and he was reminded of his upcoming apt with PCP. Ending encounter.     Sammi Lewis, EMT at 9:50 AM on January 30, 2023  [facility] Health Guide  [phone]

## 2023-01-31 ENCOUNTER — LAB (OUTPATIENT)
Dept: LAB | Facility: CLINIC | Age: 63
End: 2023-01-31
Payer: COMMERCIAL

## 2023-01-31 DIAGNOSIS — H47.213 PRIMARY OPTIC ATROPHY OF BOTH EYES: ICD-10-CM

## 2023-01-31 LAB
HCT VFR BLD AUTO: 42.6 % (ref 40–53)
VIT B12 SERPL-MCNC: 181 PG/ML (ref 232–1245)

## 2023-01-31 PROCEDURE — 83090 ASSAY OF HOMOCYSTEINE: CPT

## 2023-01-31 PROCEDURE — 86780 TREPONEMA PALLIDUM: CPT

## 2023-01-31 PROCEDURE — 85014 HEMATOCRIT: CPT

## 2023-01-31 PROCEDURE — 99000 SPECIMEN HANDLING OFFICE-LAB: CPT

## 2023-01-31 PROCEDURE — 36415 COLL VENOUS BLD VENIPUNCTURE: CPT

## 2023-01-31 PROCEDURE — 84425 ASSAY OF VITAMIN B-1: CPT | Mod: 90

## 2023-01-31 PROCEDURE — 83921 ORGANIC ACID SINGLE QUANT: CPT

## 2023-01-31 PROCEDURE — 82607 VITAMIN B-12: CPT

## 2023-01-31 PROCEDURE — 82747 ASSAY OF FOLIC ACID RBC: CPT | Mod: 90

## 2023-02-01 ENCOUNTER — TELEPHONE (OUTPATIENT)
Dept: PEDIATRICS | Facility: CLINIC | Age: 63
End: 2023-02-01
Payer: COMMERCIAL

## 2023-02-01 DIAGNOSIS — E53.8 VITAMIN B12 DEFICIENCY (NON ANEMIC): Primary | ICD-10-CM

## 2023-02-01 LAB
HCYS SERPL-SCNC: 11.6 UMOL/L (ref 4–12)
T PALLIDUM AB SER QL: NONREACTIVE

## 2023-02-01 NOTE — TELEPHONE ENCOUNTER
Hermelindo called about his labs. B12 is low and probably the issues with his eyes. Pt wants to know if he should take B12 Over the counter and if so, how much. Or should be be set up for injections? Routing to PCP for guidance.       Sammi Lewis, EMT at 10:00 AM on February 1, 2023  [facility] Health Guide  [phone]

## 2023-02-01 NOTE — TELEPHONE ENCOUNTER
I recommend he start with over the counter B12 1000mcg daily. (or follow what is eye doctor recommends - it looks like that is who ordered the test). We can also discuss this further at his upcoming appt.    Delphine Ronquillo MD  Internal Medicine/Pediatrics  [facility]

## 2023-02-01 NOTE — TELEPHONE ENCOUNTER
Talked with Hermelindo. He would like me to forward this conversation to the eye  Wants to make sure this is what the eye  Recommends. 1000mcg B12 over the counter daily. Routing to Arpit Boyd for guidance.     Sammi Lewis, EMT at 12:57 PM on February 1, 2023  [facility] Health Guide  [phone]

## 2023-02-02 ENCOUNTER — TELEPHONE (OUTPATIENT)
Dept: PEDIATRICS | Facility: CLINIC | Age: 63
End: 2023-02-02
Payer: COMMERCIAL

## 2023-02-02 LAB
FOLATE RBC-MCNC: 360 NG/ML
METHYLMALONATE SERPL-SCNC: 0.73 UMOL/L (ref 0–0.4)

## 2023-02-02 RX ORDER — CYANOCOBALAMIN 1000 UG/ML
1000 INJECTION, SOLUTION INTRAMUSCULAR; SUBCUTANEOUS EVERY OTHER DAY
Status: ACTIVE | OUTPATIENT
Start: 2023-02-02 | End: 2023-02-16

## 2023-02-02 NOTE — TELEPHONE ENCOUNTER
He would like to come in for a nurse only appointment to start with and then he said it can be discussed at his apt.    Sammi Lewis, EMT at 10:05 AM on February 2, 2023  [facility] Health Guide  [phone]

## 2023-02-02 NOTE — TELEPHONE ENCOUNTER
RN only apt made 2/6, 2/8, 2/10, 2/13 2/15 @ 10:30. Will discuss further apts at his apt on 2/16. Ending encounter.     Sammi Lewis, EMT at 10:49 AM on February 2, 2023  [facility] Health Guide  [phone]

## 2023-02-02 NOTE — TELEPHONE ENCOUNTER
Injections ordered.    Please make nurse only appointments every other day (M, W, F) until he sees me on 2/16/23.    Delphine Ronquillo MD  Internal Medicine/Pediatrics  [facility]

## 2023-02-04 ENCOUNTER — TELEPHONE (OUTPATIENT)
Dept: OPHTHALMOLOGY | Facility: CLINIC | Age: 63
End: 2023-02-04
Payer: COMMERCIAL

## 2023-02-04 NOTE — TELEPHONE ENCOUNTER
Called patient with update on his nutritional labs. In addition to B12 deficiency, for which he is taking 1000 mcg PO daily until starting injections on 2/6/23, his RBC folate was also mildly low at 360. Confirmed our recommendation that he start B-complex by mouth, which he should continue alongside dedicated B12 repletion.     We are awaiting his thiamine level and may need to adjust his overall repletion regimen if this is severely deficient.     He verbalizes understanding, no questions/concerns at this time.    Vasquez Owens MD PhD  Fellow, Neuro-Ophthalmology

## 2023-02-05 LAB — VIT B1 PYROPHOSHATE BLD-SCNC: 113 NMOL/L

## 2023-02-06 ENCOUNTER — ALLIED HEALTH/NURSE VISIT (OUTPATIENT)
Dept: PEDIATRICS | Facility: CLINIC | Age: 63
End: 2023-02-06
Payer: COMMERCIAL

## 2023-02-06 DIAGNOSIS — E53.8 VITAMIN B12 DEFICIENCY (NON ANEMIC): Primary | ICD-10-CM

## 2023-02-06 PROCEDURE — 99207 PR NO CHARGE NURSE ONLY: CPT

## 2023-02-06 PROCEDURE — 96372 THER/PROPH/DIAG INJ SC/IM: CPT | Performed by: PEDIATRICS

## 2023-02-06 RX ADMIN — CYANOCOBALAMIN 1000 MCG: 1000 INJECTION, SOLUTION INTRAMUSCULAR; SUBCUTANEOUS at 10:18

## 2023-02-08 ENCOUNTER — ALLIED HEALTH/NURSE VISIT (OUTPATIENT)
Dept: PEDIATRICS | Facility: CLINIC | Age: 63
End: 2023-02-08
Payer: COMMERCIAL

## 2023-02-08 DIAGNOSIS — E53.8 VITAMIN B12 DEFICIENCY (NON ANEMIC): Primary | ICD-10-CM

## 2023-02-08 PROCEDURE — 99207 PR NO CHARGE NURSE ONLY: CPT

## 2023-02-08 PROCEDURE — 96372 THER/PROPH/DIAG INJ SC/IM: CPT | Performed by: PEDIATRICS

## 2023-02-08 RX ADMIN — CYANOCOBALAMIN 1000 MCG: 1000 INJECTION, SOLUTION INTRAMUSCULAR; SUBCUTANEOUS at 10:49

## 2023-02-08 NOTE — PROGRESS NOTES
Clinic Administered Medication Documentation    Administrations This Visit     cyanocobalamin injection 1,000 mcg     Admin Date  02/08/2023 Action  Given Dose  1,000 mcg Route  Intramuscular Site  Left Deltoid Administered By  Viviana Damon CMA    Ordering Provider: Delphine Ronquillo MD    NDC: 63115-966-20    Lot#: [id number]    : [employer]    Patient Supplied?: No    Comments: patient scheduled for Friday next dose                  Injectable Medication Documentation    Patient was given Cyanocobalamin (B-12). Prior to medication administration, verified patients identity using patient s name and date of birth. Please see MAR and medication order for additional information. Patient instructed to stay in clinic after the injection but patient declined.        Was entire vial of medication used? Yes  Vial/Syringe: Single dose vial  Expiration Date:  1/2024  Was this medication supplied by the patient? No     JODY Sterling

## 2023-02-09 ENCOUNTER — TELEPHONE (OUTPATIENT)
Dept: PEDIATRICS | Facility: CLINIC | Age: 63
End: 2023-02-09
Payer: COMMERCIAL

## 2023-02-09 NOTE — TELEPHONE ENCOUNTER
Pt called to make sure he could get the Covid and Shingles shot at his physical coming up. Went over pt's insurance and does not have medicare. Told the pt no problem we can do it here. Ending encounter.     Sammi Lewis, EMT at 11:25 AM on February 9, 2023  [facility] Health Guide  [phone]

## 2023-02-10 ENCOUNTER — ALLIED HEALTH/NURSE VISIT (OUTPATIENT)
Dept: PEDIATRICS | Facility: CLINIC | Age: 63
End: 2023-02-10
Payer: COMMERCIAL

## 2023-02-10 DIAGNOSIS — E53.8 VITAMIN B12 DEFICIENCY (NON ANEMIC): Primary | ICD-10-CM

## 2023-02-10 PROCEDURE — 99207 PR NO CHARGE NURSE ONLY: CPT

## 2023-02-10 PROCEDURE — 96372 THER/PROPH/DIAG INJ SC/IM: CPT | Performed by: PEDIATRICS

## 2023-02-10 RX ADMIN — CYANOCOBALAMIN 1000 MCG: 1000 INJECTION, SOLUTION INTRAMUSCULAR; SUBCUTANEOUS at 10:45

## 2023-02-10 NOTE — PROGRESS NOTES
Clinic Administered Medication Documentation    Administrations This Visit     cyanocobalamin injection 1,000 mcg     Admin Date  02/10/2023 Action  Given Dose  1,000 mcg Route  Intramuscular Site  Right Deltoid Administered By  Viviana Damon CMA    Ordering Provider: Delphine Ronquillo MD    NDC: 68987-177-60    Lot#: [id number]    : [email or web address] [facility]    Patient Supplied?: No                  Injectable Medication Documentation    Patient was given Cyanocobalamin (B-12). Prior to medication administration, verified patients identity using patient s name and date of birth. Please see MAR and medication order for additional information. Patient instructed to stay in clinic after the injection but patient declined.      Was entire vial of medication used? Yes  Vial/Syringe: Single dose vial  Expiration Date:  1/2024  Was this medication supplied by the patient? No     JODY Sterling

## 2023-02-13 ENCOUNTER — ALLIED HEALTH/NURSE VISIT (OUTPATIENT)
Dept: PEDIATRICS | Facility: CLINIC | Age: 63
End: 2023-02-13
Payer: COMMERCIAL

## 2023-02-13 DIAGNOSIS — E53.8 VITAMIN B12 DEFICIENCY (NON ANEMIC): Primary | ICD-10-CM

## 2023-02-13 PROCEDURE — 96372 THER/PROPH/DIAG INJ SC/IM: CPT | Performed by: PEDIATRICS

## 2023-02-13 PROCEDURE — 99207 PR NO CHARGE NURSE ONLY: CPT

## 2023-02-13 RX ADMIN — CYANOCOBALAMIN 1000 MCG: 1000 INJECTION, SOLUTION INTRAMUSCULAR; SUBCUTANEOUS at 10:26

## 2023-02-13 NOTE — PROGRESS NOTES
Clinic Administered Medication Documentation    Administrations This Visit     cyanocobalamin injection 1,000 mcg     Admin Date  02/13/2023 Action  Given Dose  1,000 mcg Route  Intramuscular Site  Right Deltoid Administered By  January Cedillo MA    Ordering Provider: Delphine Ronquillo MD    Patient Supplied?: No                  Injectable Medication Documentation    Patient was given Cyanocobalamin (B-12). Prior to medication administration, verified patients identity using patient s name and date of birth. Please see MAR and medication order for additional information. Patient instructed to remain in clinic for 15 minutes, report any adverse reaction to staff immediately  and stay in clinic after the injection but patient declined.      Was entire vial of medication used? Yes  Vial/Syringe: Single dose vial  Expiration Date:  04/2024   Was this medication supplied by the patient? No    Name of provider who requested the medication administration: Delphine Ronquillo    Name of provider on site (faculty or community preceptor) at the time of performing the medication administration: Carmelina Aguilar    Date of next administration: 2/15/2023 - 3 times a week   Date of next office visit with provider to renew medication plan (must be seen annually): 2/15/2023      January Cedillo MA on 2/13/2023 at 10:29 AM

## 2023-02-15 ENCOUNTER — ALLIED HEALTH/NURSE VISIT (OUTPATIENT)
Dept: PEDIATRICS | Facility: CLINIC | Age: 63
End: 2023-02-15
Payer: COMMERCIAL

## 2023-02-15 DIAGNOSIS — E53.8 VITAMIN B12 DEFICIENCY (NON ANEMIC): Primary | ICD-10-CM

## 2023-02-15 PROCEDURE — 99207 PR NO CHARGE NURSE ONLY: CPT

## 2023-02-15 PROCEDURE — 96372 THER/PROPH/DIAG INJ SC/IM: CPT | Performed by: PEDIATRICS

## 2023-02-15 RX ADMIN — CYANOCOBALAMIN 1000 MCG: 1000 INJECTION, SOLUTION INTRAMUSCULAR; SUBCUTANEOUS at 11:44

## 2023-02-15 NOTE — PROGRESS NOTES
Clinic Administered Medication Documentation    Administrations This Visit     cyanocobalamin injection 1,000 mcg     Admin Date  02/15/2023 Action  Given Dose  1,000 mcg Route  Intramuscular Site  Right Deltoid Administered By  Viviana Damon CMA    Ordering Provider: Delphine Ronquillo MD    NDC: 29538-700-08    Lot#: [id number]    : [email or web address]    Patient Supplied?: No                  Injectable Medication Documentation    Patient was given Cyanocobalamin (B-12). Prior to medication administration, verified patients identity using patient s name and date of birth. Please see MAR and medication order for additional information. Patient instructed to remain in clinic for 15 minutes and stay in clinic after the injection but patient declined.      Was entire vial of medication used? Yes  Vial/Syringe: Single dose vial  Expiration Date:  4/2024  Was this medication supplied by the patient? No     JODY Sterling

## 2023-02-16 ENCOUNTER — OFFICE VISIT (OUTPATIENT)
Dept: PEDIATRICS | Facility: CLINIC | Age: 63
End: 2023-02-16
Payer: COMMERCIAL

## 2023-02-16 VITALS
TEMPERATURE: 97.3 F | RESPIRATION RATE: 18 BRPM | OXYGEN SATURATION: 98 % | SYSTOLIC BLOOD PRESSURE: 132 MMHG | BODY MASS INDEX: 21.86 KG/M2 | HEIGHT: 68 IN | HEART RATE: 76 BPM | DIASTOLIC BLOOD PRESSURE: 76 MMHG | WEIGHT: 144.2 LBS

## 2023-02-16 DIAGNOSIS — Z00.01 ENCOUNTER FOR GENERAL ADULT MEDICAL EXAMINATION WITH ABNORMAL FINDINGS: Primary | ICD-10-CM

## 2023-02-16 DIAGNOSIS — Z72.0 TOBACCO ABUSE: ICD-10-CM

## 2023-02-16 DIAGNOSIS — R35.0 BENIGN PROSTATIC HYPERPLASIA WITH URINARY FREQUENCY: ICD-10-CM

## 2023-02-16 DIAGNOSIS — N40.1 BENIGN PROSTATIC HYPERPLASIA WITH URINARY FREQUENCY: ICD-10-CM

## 2023-02-16 DIAGNOSIS — L60.9 NAIL ABNORMALITIES: ICD-10-CM

## 2023-02-16 DIAGNOSIS — F51.01 PRIMARY INSOMNIA: ICD-10-CM

## 2023-02-16 DIAGNOSIS — E53.8 VITAMIN B12 DEFICIENCY (NON ANEMIC): ICD-10-CM

## 2023-02-16 LAB
KOH PREPARATION: NORMAL
KOH PREPARATION: NORMAL

## 2023-02-16 PROCEDURE — 99214 OFFICE O/P EST MOD 30 MIN: CPT | Mod: 25 | Performed by: PEDIATRICS

## 2023-02-16 PROCEDURE — 99396 PREV VISIT EST AGE 40-64: CPT | Mod: 25 | Performed by: PEDIATRICS

## 2023-02-16 PROCEDURE — 0124A COVID-19 VACCINE BIVALENT BOOSTER 12+ (PFIZER): CPT | Performed by: PEDIATRICS

## 2023-02-16 PROCEDURE — 91312 COVID-19 VACCINE BIVALENT BOOSTER 12+ (PFIZER): CPT | Performed by: PEDIATRICS

## 2023-02-16 PROCEDURE — 90682 RIV4 VACC RECOMBINANT DNA IM: CPT | Performed by: PEDIATRICS

## 2023-02-16 PROCEDURE — 87220 TISSUE EXAM FOR FUNGI: CPT | Performed by: PEDIATRICS

## 2023-02-16 PROCEDURE — 90471 IMMUNIZATION ADMIN: CPT | Performed by: PEDIATRICS

## 2023-02-16 RX ORDER — TRAZODONE HYDROCHLORIDE 100 MG/1
200 TABLET ORAL AT BEDTIME
Qty: 180 TABLET | Refills: 3 | Status: SHIPPED | OUTPATIENT
Start: 2023-02-16 | End: 2024-02-23

## 2023-02-16 RX ORDER — MULTIVITAMIN WITH IRON
1 TABLET ORAL DAILY
COMMUNITY

## 2023-02-16 RX ORDER — TERAZOSIN 10 MG/1
10 CAPSULE ORAL AT BEDTIME
Qty: 90 CAPSULE | Refills: 3 | Status: SHIPPED | OUTPATIENT
Start: 2023-02-16 | End: 2024-02-23

## 2023-02-16 SDOH — ECONOMIC STABILITY: TRANSPORTATION INSECURITY
IN THE PAST 12 MONTHS, HAS LACK OF TRANSPORTATION KEPT YOU FROM MEETINGS, WORK, OR FROM GETTING THINGS NEEDED FOR DAILY LIVING?: NO

## 2023-02-16 SDOH — ECONOMIC STABILITY: INCOME INSECURITY: HOW HARD IS IT FOR YOU TO PAY FOR THE VERY BASICS LIKE FOOD, HOUSING, MEDICAL CARE, AND HEATING?: NOT HARD AT ALL

## 2023-02-16 SDOH — HEALTH STABILITY: PHYSICAL HEALTH: ON AVERAGE, HOW MANY DAYS PER WEEK DO YOU ENGAGE IN MODERATE TO STRENUOUS EXERCISE (LIKE A BRISK WALK)?: 5 DAYS

## 2023-02-16 SDOH — ECONOMIC STABILITY: FOOD INSECURITY: WITHIN THE PAST 12 MONTHS, THE FOOD YOU BOUGHT JUST DIDN'T LAST AND YOU DIDN'T HAVE MONEY TO GET MORE.: NEVER TRUE

## 2023-02-16 SDOH — ECONOMIC STABILITY: TRANSPORTATION INSECURITY
IN THE PAST 12 MONTHS, HAS THE LACK OF TRANSPORTATION KEPT YOU FROM MEDICAL APPOINTMENTS OR FROM GETTING MEDICATIONS?: NO

## 2023-02-16 SDOH — HEALTH STABILITY: PHYSICAL HEALTH: ON AVERAGE, HOW MANY MINUTES DO YOU ENGAGE IN EXERCISE AT THIS LEVEL?: 150+ MIN

## 2023-02-16 SDOH — ECONOMIC STABILITY: FOOD INSECURITY: WITHIN THE PAST 12 MONTHS, YOU WORRIED THAT YOUR FOOD WOULD RUN OUT BEFORE YOU GOT MONEY TO BUY MORE.: NEVER TRUE

## 2023-02-16 SDOH — ECONOMIC STABILITY: INCOME INSECURITY: IN THE LAST 12 MONTHS, WAS THERE A TIME WHEN YOU WERE NOT ABLE TO PAY THE MORTGAGE OR RENT ON TIME?: NO

## 2023-02-16 ASSESSMENT — ENCOUNTER SYMPTOMS
SORE THROAT: 0
HEMATURIA: 0
FREQUENCY: 1
ABDOMINAL PAIN: 0
NAUSEA: 0
PARESTHESIAS: 0
SHORTNESS OF BREATH: 0
WEAKNESS: 0
MYALGIAS: 0
HEMATOCHEZIA: 0
DIZZINESS: 0
ARTHRALGIAS: 0
PALPITATIONS: 0
COUGH: 0
HEARTBURN: 0
DIARRHEA: 0
JOINT SWELLING: 0
HEADACHES: 0
FEVER: 0
CONSTIPATION: 0
CHILLS: 0
DYSURIA: 0
NERVOUS/ANXIOUS: 0
EYE PAIN: 0

## 2023-02-16 ASSESSMENT — SOCIAL DETERMINANTS OF HEALTH (SDOH)
DO YOU BELONG TO ANY CLUBS OR ORGANIZATIONS SUCH AS CHURCH GROUPS UNIONS, FRATERNAL OR ATHLETIC GROUPS, OR SCHOOL GROUPS?: NO
IN A TYPICAL WEEK, HOW MANY TIMES DO YOU TALK ON THE PHONE WITH FAMILY, FRIENDS, OR NEIGHBORS?: ONCE A WEEK
HOW OFTEN DO YOU ATTEND CHURCH OR RELIGIOUS SERVICES?: MORE THAN 4 TIMES PER YEAR
HOW OFTEN DO YOU GET TOGETHER WITH FRIENDS OR RELATIVES?: ONCE A WEEK

## 2023-02-16 ASSESSMENT — LIFESTYLE VARIABLES
HOW OFTEN DO YOU HAVE SIX OR MORE DRINKS ON ONE OCCASION: NEVER
HOW OFTEN DO YOU HAVE A DRINK CONTAINING ALCOHOL: MONTHLY OR LESS
AUDIT-C TOTAL SCORE: 1
SKIP TO QUESTIONS 9-10: 1
HOW MANY STANDARD DRINKS CONTAINING ALCOHOL DO YOU HAVE ON A TYPICAL DAY: 1 OR 2

## 2023-02-16 ASSESSMENT — PAIN SCALES - GENERAL: PAINLEVEL: NO PAIN (0)

## 2023-02-16 NOTE — PROGRESS NOTES
SUBJECTIVE:   CC: Hermelindo is an 62 year old who presents for preventative health visit.   Patient has been advised of split billing requirements and indicates understanding: Yes  Healthy Habits:     Getting at least 3 servings of Calcium per day:  Yes    Bi-annual eye exam:  Yes    Dental care twice a year:  Yes    Sleep apnea or symptoms of sleep apnea:  None    Diet:  Regular (no restrictions)    Frequency of exercise:  2-3 days/week    Duration of exercise:  15-30 minutes    Medication side effects:  None    PHQ-2 Total Score: 0    Additional concerns today:  No    Eye issues started when he applied for new drivers license.  Had to get eye exam, glasses prescription changed and was told that nerves going to retina and cornea do not look good. He was then sent to the  to see a specialist.  Was told it could be a tumor, MS, etoh, low B12, head trauma.  Patient doesn't drink, MRi normal, patient does have h/o head trauma 2011 - split skull open.     Trazodone - taking 150mg per night to sleep. Occ needs 200mg    Urinary - trouble starting, stopping, frequency. No incontience. No fluids in evening or night.  Wakes 3-4 times normally.  If soup for dinner then up even more.         Today's PHQ-2 Score:   PHQ-2 ( 1999 Pf[employer]) 2/16/2023   Q1: Little interest or pleasure in doing things 0   Q2: Feeling down, depressed or hopeless 0   PHQ-2 Score 0   PHQ-2 Total Score (12-17 Years)- Positive if 3 or more points; Administer PHQ-A if positive -   Q1: Little interest or pleasure in doing things Not at all   Q2: Feeling down, depressed or hopeless Not at all   PHQ-2 Score 0           Social History     Tobacco Use     Smoking status: Every Day     Packs/day: 0.30     Years: 30.00     Pack years: 9.00     Types: Cigarettes     Smokeless tobacco: Never     Tobacco comments:     1 cigarette every 2-3 weeks   Substance Use Topics     Alcohol use: Yes     Comment: rare         Alcohol Use 2/16/2023   Prescreen: >3 drinks/day or >7  "drinks/week? No   Prescreen: >3 drinks/day or >7 drinks/week? -   AUDIT SCORE  -       Last PSA:   PSA   Date Value Ref Range Status   03/31/2021 1.24 0 - 4 ug/L Final     Comment:     Assay Method:  Chemiluminescence using Siemens Vista analyzer       Reviewed orders with patient. Reviewed health maintenance and updated orders accordingly - Yes      Reviewed and updated as needed this visit by clinical staff   Tobacco  Allergies  Meds              Reviewed and updated as needed this visit by Provider                     Review of Systems   Constitutional: Negative for chills and fever.   HENT: Negative for congestion, ear pain, hearing loss and sore throat.    Eyes: Negative for pain and visual disturbance.   Respiratory: Negative for cough and shortness of breath.    Cardiovascular: Negative for chest pain, palpitations and peripheral edema.   Gastrointestinal: Negative for abdominal pain, constipation, diarrhea, heartburn, hematochezia and nausea.   Genitourinary: Positive for frequency and urgency. Negative for dysuria, genital sores, hematuria, impotence and penile discharge.   Musculoskeletal: Negative for arthralgias, joint swelling and myalgias.   Skin: Negative for rash.   Neurological: Negative for dizziness, weakness, headaches and paresthesias.   Psychiatric/Behavioral: Negative for mood changes. The patient is not nervous/anxious.          OBJECTIVE:   /76 (BP Location: Right arm, Patient Position: Sitting, Cuff Size: Adult Regular)   Pulse 76   Temp 97.3  F (36.3  C) (Tympanic)   Resp 18   Ht 1.727 m (5' 8\")   Wt 65.4 kg (144 lb 3.2 oz)   SpO2 98%   BMI 21.93 kg/m      Physical Exam  GENERAL: healthy, alert and no distress  EYES: Eyes grossly normal to inspection, PERRL and conjunctivae and sclerae normal  HENT: ear canals and TM's normal, nose and mouth without ulcers or lesions  NECK: no adenopathy, no asymmetry, masses, or scars and thyroid normal to palpation  RESP: lungs clear to " auscultation - no rales, rhonchi or wheezes  CV: regular rate and rhythm, normal S1 S2, no S3 or S4, no murmur, click or rub, no peripheral edema and peripheral pulses strong  ABDOMEN: soft, nontender, no hepatosplenomegaly, no masses and bowel sounds normal  MS: no gross musculoskeletal defects noted, no edema  SKIN: big left toenail, thick with ridges  NEURO: Normal strength and tone, mentation intact and speech normal  PSYCH: mentation appears normal, affect normal/bright    Diagnostic Test Results:  Labs reviewed in Epic    ASSESSMENT/PLAN:   (Z00.01) Encounter for general adult medical examination with abnormal findings  (primary encounter diagnosis)  Comment: overall healthy  Plan:     (E53.8) Vitamin B12 deficiency (non anemic)  Comment: with eye concerns   Plan: Vitamin B12        Plan with ophthalmology - IM B12 every other day x 2 weeks, then monthly until levels normal and then can take daily.  Will take last IM tomorrow and then we will check labs next week.     (F51.01) Primary insomnia  Comment: increase to 200mg QHS  Plan: traZODone (DESYREL) 100 MG tablet            (Z72.0) Tobacco abuse  Comment: 3-4 per day  Plan: not ready to completely quit  20+ pyh (since college), declines lung ca screeing    (N40.1,  R35.0) Benign prostatic hyperplasia with urinary frequency  Comment: failed lower dose of terazsoin and flomax, will try increaed dose  Plan: terazosin (HYTRIN) 10 MG capsule            (L60.9) Nail abnormalities  Comment:   Plan: KOH prep (skin, hair or nails only)              Patient has been advised of split billing requirements and indicates understanding: Yes      COUNSELING:   Reviewed preventive health counseling, as reflected in patient instructions        He reports that he has been smoking cigarettes. He has a 9.00 pack-year smoking history. He has never used smokeless tobacco.  Nicotine/Tobacco Cessation Plan:   Information offered: Patient not interested at this time        Delphine Hamilton  MD Yg  [facility]

## 2023-02-17 ENCOUNTER — ALLIED HEALTH/NURSE VISIT (OUTPATIENT)
Dept: PEDIATRICS | Facility: CLINIC | Age: 63
End: 2023-02-17
Payer: COMMERCIAL

## 2023-02-17 DIAGNOSIS — E53.8 VITAMIN B12 DEFICIENCY (NON ANEMIC): Primary | ICD-10-CM

## 2023-02-17 DIAGNOSIS — Z23 ENCOUNTER FOR IMMUNIZATION: ICD-10-CM

## 2023-02-17 PROCEDURE — 99207 PR NO CHARGE NURSE ONLY: CPT

## 2023-02-17 PROCEDURE — 96372 THER/PROPH/DIAG INJ SC/IM: CPT | Performed by: PEDIATRICS

## 2023-02-17 RX ADMIN — CYANOCOBALAMIN 1000 MCG: 1000 INJECTION, SOLUTION INTRAMUSCULAR; SUBCUTANEOUS at 09:51

## 2023-02-17 NOTE — PROGRESS NOTES
Clinic Administered Medication Documentation    Administrations This Visit     cyanocobalamin injection 1,000 mcg     Admin Date  02/17/2023 Action  Given Dose  1,000 mcg Route  Intramuscular Site  Right Deltoid Administered By  January Cedillo MA    Ordering Provider: Delphine Ronquillo MD    NDC: 49374-636-43    Lot#: [id number]    : [employer]    Patient Supplied?: No                  Injectable Medication Documentation    Patient was given Cyanocobalamin (B-12). Prior to medication administration, verified patients identity using patient s name and date of birth. Please see MAR and medication order for additional information. Patient instructed to remain in clinic for 15 minutes, report any adverse reaction to staff immediately  and stay in clinic after the injection but patient declined.      Was entire vial of medication used? Yes  Vial/Syringe: Single dose vial  Expiration Date:  04/2024  Was this medication supplied by the patient? No    Name of provider who requested the medication administration: Delphine Ronquillo  Name of provider on site (faculty or community preceptor) at the time of performing the medication administration: Dr. Matta     Date of next administration: n/a  Date of next office visit with provider to renew medication plan (must be seen annually): n/a     LILY Edwards

## 2023-02-20 ENCOUNTER — LAB (OUTPATIENT)
Dept: LAB | Facility: CLINIC | Age: 63
End: 2023-02-20
Payer: COMMERCIAL

## 2023-02-20 DIAGNOSIS — E53.8 VITAMIN B12 DEFICIENCY (NON ANEMIC): ICD-10-CM

## 2023-02-20 LAB — VIT B12 SERPL-MCNC: 1643 PG/ML (ref 232–1245)

## 2023-02-20 PROCEDURE — 82607 VITAMIN B-12: CPT

## 2023-02-20 PROCEDURE — 36415 COLL VENOUS BLD VENIPUNCTURE: CPT

## 2023-02-21 DIAGNOSIS — E53.8 VITAMIN B12 DEFICIENCY (NON ANEMIC): Primary | ICD-10-CM

## 2023-02-21 RX ORDER — LANOLIN ALCOHOL/MO/W.PET/CERES
1000 CREAM (GRAM) TOPICAL DAILY
Qty: 90 TABLET | Refills: 3 | Status: SHIPPED | OUTPATIENT
Start: 2023-02-21

## 2023-02-27 ENCOUNTER — TELEPHONE (OUTPATIENT)
Dept: PEDIATRICS | Facility: CLINIC | Age: 63
End: 2023-02-27
Payer: COMMERCIAL

## 2023-02-27 NOTE — TELEPHONE ENCOUNTER
Hermelindo wanted to know about labs that had recently been done. Went over labs and suggestions per PCP. Pt understood.     Sammi Lewis, EMT at 10:19 AM on February 27, 2023  [facility] Health Guide  [phone]

## 2023-04-17 ENCOUNTER — TELEPHONE (OUTPATIENT)
Dept: PEDIATRICS | Facility: CLINIC | Age: 63
End: 2023-04-17

## 2023-04-17 ENCOUNTER — VIRTUAL VISIT (OUTPATIENT)
Dept: PEDIATRICS | Facility: CLINIC | Age: 63
End: 2023-04-17
Payer: COMMERCIAL

## 2023-04-17 DIAGNOSIS — N40.1 BENIGN PROSTATIC HYPERPLASIA WITH URINARY FREQUENCY: Primary | ICD-10-CM

## 2023-04-17 DIAGNOSIS — E53.8 VITAMIN B12 DEFICIENCY (NON ANEMIC): ICD-10-CM

## 2023-04-17 DIAGNOSIS — R35.0 BENIGN PROSTATIC HYPERPLASIA WITH URINARY FREQUENCY: Primary | ICD-10-CM

## 2023-04-17 PROCEDURE — 99214 OFFICE O/P EST MOD 30 MIN: CPT | Mod: VID | Performed by: PEDIATRICS

## 2023-04-17 RX ORDER — TERAZOSIN 10 MG/1
20 CAPSULE ORAL AT BEDTIME
Qty: 180 CAPSULE | Refills: 3 | Status: SHIPPED | OUTPATIENT
Start: 2023-04-17 | End: 2024-02-23

## 2023-04-17 NOTE — PROGRESS NOTES
"Hermelindo is a 63 year old who is being evaluated via a billable video visit.      How would you like to obtain your AVS? MyChart  If the video visit is dropped, the invitation should be resent by: Text to cell phone: [phone]  Will anyone else be joining your video visit? No        Assessment & Plan     Benign prostatic hyperplasia with urinary frequency  20mg dose helping significantly - will continue with this.  Combination with the trazodone 200mg is also helping.  We will continue both.  Terazosin is now maxed out.  Patient feeling much for refreshed in AM>  - terazosin (HYTRIN) 10 MG capsule; Take 2 capsules (20 mg) by mouth At Bedtime    Vitamin B12 deficiency (non anemic)  For eye health - last check too high - now taking daily maintenance.  - Vitamin B12; Future             There are no Patient Instructions on file for this visit.    Delphine Ronquillo MD  [facility]ST[facility] Casarez is a 63 year old, presenting for the following health issues:  No chief complaint on file.         View : No data to display.              History of Present Illness       Reason for visit:  Dr amaya request    He eats 2-3 servings of fruits and vegetables daily.He consumes 0 sweetened beverage(s) daily.He exercises with enough effort to increase his heart rate 20 to 29 minutes per day.  He exercises with enough effort to increase his heart rate 4 days per week.   He is taking medications regularly.       Last appt 2/16/23 - physical    Urinary problems - \"Urinary - trouble starting, stopping, frequency. No incontience. No fluids in evening or night.  Wakes 3-4 times normally.  If soup for dinner then up even more. \"  Trying increased dose of the terazosin to 10mg (max 20)    Sleep - increased traozdone to 200mg. Sleeping 7-8.5 hours, especially since not have to get up to urinate.    Since then, patient took 20mg one night by accident and noticed a difference. Patient has been taking 30mg    Review of " Systems         Objective           Vitals:  No vitals were obtained today due to virtual visit.    Physical Exam   GENERAL: Healthy, alert and no distress  EYES: Eyes grossly normal to inspection.  No discharge or erythema, or obvious scleral/conjunctival abnormalities.  RESP: No audible wheeze, cough, or visible cyanosis.  No visible retractions or increased work of breathing.    SKIN: Visible skin clear. No significant rash, abnormal pigmentation or lesions.  NEURO: Cranial nerves grossly intact.  Mentation and speech appropriate for age.  PSYCH: Mentation appears normal, affect normal/bright, judgement and insight intact, normal speech and appearance well-groomed.                Video-Visit Details    Type of service:  Video Visit     Originating Location (pt. Location): Home  Distant Location (provider location):  Off-site  Platform used for Video Visit: [employer]

## 2023-04-17 NOTE — TELEPHONE ENCOUNTER
Pt called to check on his apt. Got confused as he thought his apt time was 8:10. Explained check in time 8:10 provider time 8:30

## 2023-04-18 ENCOUNTER — TELEPHONE (OUTPATIENT)
Dept: PEDIATRICS | Facility: CLINIC | Age: 63
End: 2023-04-18
Payer: COMMERCIAL

## 2023-04-18 NOTE — TELEPHONE ENCOUNTER
States he is getting emails to schedule apts and he does not know what they are for. I am unsure myself. Pt states that PCP made him apt with lab for tomorrow @ 8:30 but he does not have an apt. Had to schedule. Ending encounter.     Sammi Lewis, EMT at 11:32 AM on April 18, 2023  [facility] Health Guide  [phone]

## 2023-04-19 ENCOUNTER — LAB (OUTPATIENT)
Dept: LAB | Facility: CLINIC | Age: 63
End: 2023-04-19
Payer: COMMERCIAL

## 2023-04-19 DIAGNOSIS — E53.8 VITAMIN B12 DEFICIENCY (NON ANEMIC): ICD-10-CM

## 2023-04-19 LAB — VIT B12 SERPL-MCNC: 500 PG/ML (ref 232–1245)

## 2023-04-19 PROCEDURE — 36415 COLL VENOUS BLD VENIPUNCTURE: CPT

## 2023-04-19 PROCEDURE — 82607 VITAMIN B-12: CPT

## 2023-04-25 ENCOUNTER — TELEPHONE (OUTPATIENT)
Dept: PEDIATRICS | Facility: CLINIC | Age: 63
End: 2023-04-25
Payer: COMMERCIAL

## 2023-04-25 NOTE — TELEPHONE ENCOUNTER
L foot toe nail fungus. Seen by PCP at last visit. It has now spread to other toes and foot. What recommendation on ointment or RX that he should try. Routing to PCP for guidance.     Sammi Lewis, EMT at 10:25 AM on April 25, 2023  [facility] Health Guide  [phone]

## 2023-04-26 NOTE — TELEPHONE ENCOUNTER
Pt scheduled for virtual apt.     Sammi Lewis, EMT at 12:59 PM on April 26, 2023  [facility] Guide  [phone]

## 2023-05-05 ENCOUNTER — VIRTUAL VISIT (OUTPATIENT)
Dept: PEDIATRICS | Facility: CLINIC | Age: 63
End: 2023-05-05
Payer: COMMERCIAL

## 2023-05-05 DIAGNOSIS — B35.1 ONYCHOMYCOSIS: Primary | ICD-10-CM

## 2023-05-05 DIAGNOSIS — B35.3 TINEA PEDIS OF BOTH FEET: ICD-10-CM

## 2023-05-05 PROCEDURE — 99214 OFFICE O/P EST MOD 30 MIN: CPT | Mod: VID | Performed by: PEDIATRICS

## 2023-05-05 RX ORDER — CICLOPIROX 80 MG/ML
SOLUTION TOPICAL
Qty: 6.6 ML | Refills: 11 | Status: SHIPPED | OUTPATIENT
Start: 2023-05-05

## 2023-05-05 NOTE — PATIENT INSTRUCTIONS
Nail - use topical ointment x 3 months    For athelete's foot - use over the counter clotrimazole - twice daily until resolved and then one extra week after that

## 2023-05-05 NOTE — PROGRESS NOTES
Hermelindo is a 63 year old who is being evaluated via a billable video visit.      How would you like to obtain your AVS? MyChart  If the video visit is dropped, the invitation should be resent by: Text to cell phone: [phone]  Will anyone else be joining your video visit? No        Assessment & Plan     Onychomycosis  See PI  - ciclopirox (PENLAC) 8 % external solution; Apply to adjacent skin and affected nails daily.  Remove with alcohol every 7 days, then repeat.    Tinea pedis of both feet  See PI               Patient Instructions   Nail - use topical ointment x 3 months    For athelete's foot - use over the counter clotrimazole - twice daily until resolved and then one extra week after that      Delphine Ronquillo MD  [facility]    Jayden Casarez is a 63 year old, presenting for the following health issues:  Foot Problems (Bilateral foot fungus, was seen for the left but now he has noticed new symptoms )         View : No data to display.              History of Present Illness       Reason for visit:  Foot fungis    He eats 4 or more servings of fruits and vegetables daily.He consumes 0 sweetened beverage(s) daily.He exercises with enough effort to increase his heart rate 30 to 60 minutes per day.  He exercises with enough effort to increase his heart rate 5 days per week.   He is taking medications regularly.     Between toes and on top of toes itch a lot. Skin flakes when he itches them, aching toes. No pain. The big toe (looked at in February) getting worse. Light faded yellow tint on top of toes.     The nail fungus is worsening as well.               Review of Systems         Objective           Vitals:  No vitals were obtained today due to virtual visit.    Physical Exam                   Video-Visit Details    Type of service:  Video Visit     Originating Location (pt. Location): Home  Distant Location (provider location):  On-site  Platform used for Video Visit: AGC

## 2023-05-22 NOTE — NURSING NOTE
Discharge Information    IV Discontiued Time:  NA    Amount of Fluid Infused:  NA    Discharge Criteria = When patient returns to baseline or as per MD order    Consciousness:  Pt is fully awake    Circulation:  BP +/- 20% of pre-procedure level    Respiration:  Patient is able to breathe deeply    O2 Sat:  Patient is able to maintain O2 Sat >92% on room air    Activity:  Moves 4 extremities on command    Ambulation:  Patient is able to stand and walk or stand and pivot into wheelchair    Dressing:  Clean/dry or No Dressing    Notes:   Discharge instructions and AVS given to patient    Patient meets criteria for discharge?  YES    Admitted to PCU?  No    Responsible adult present to accompany patient home?  Yes    Signature/Title:    Sammi Riley RN Care Coordinator  [facility]   Peng Advancement Flap Text: The defect edges were debeveled with a #15 scalpel blade. Given the location of the defect, shape of the defect and the proximity to free margins a Peng advancement flap was deemed most appropriate. Using a sterile surgical marker, an appropriate advancement flap was drawn incorporating the defect and placing the expected incisions within the relaxed skin tension lines where possible. The area thus outlined was incised deep to adipose tissue with a #15 scalpel blade. The skin margins were undermined to an appropriate distance in all directions utilizing iris scissors. Following this, the designed flap was advanced and carried over into the primary defect and sutured into place.

## 2023-10-31 ENCOUNTER — TELEPHONE (OUTPATIENT)
Dept: PEDIATRICS | Facility: CLINIC | Age: 63
End: 2023-10-31
Payer: COMMERCIAL

## 2023-10-31 NOTE — TELEPHONE ENCOUNTER
Called pt to remind him his colonoscopy is due soon    Sammi Lewis, EMT at 4:22 PM on October 31, 2023  [facility] Health Guide  [phone]

## 2024-02-09 ENCOUNTER — TELEPHONE (OUTPATIENT)
Dept: PEDIATRICS | Facility: CLINIC | Age: 64
End: 2024-02-09
Payer: COMMERCIAL

## 2024-02-09 DIAGNOSIS — Z12.5 SCREENING FOR PROSTATE CANCER: ICD-10-CM

## 2024-02-09 DIAGNOSIS — R35.0 BENIGN PROSTATIC HYPERPLASIA WITH URINARY FREQUENCY: ICD-10-CM

## 2024-02-09 DIAGNOSIS — N40.1 BENIGN PROSTATIC HYPERPLASIA WITH URINARY FREQUENCY: ICD-10-CM

## 2024-02-09 DIAGNOSIS — E53.8 VITAMIN B12 DEFICIENCY (NON ANEMIC): Primary | ICD-10-CM

## 2024-02-09 DIAGNOSIS — Z13.1 SCREENING FOR DIABETES MELLITUS: ICD-10-CM

## 2024-02-09 DIAGNOSIS — Z13.6 SCREENING FOR CARDIOVASCULAR CONDITION: ICD-10-CM

## 2024-02-09 NOTE — TELEPHONE ENCOUNTER
Hermelindo calls in and wants to know if PCP would like lab work done b4 his physical? He is wondering if a B12 can also be checked due to HX. Routing to PCP for guidance

## 2024-02-16 ENCOUNTER — LAB (OUTPATIENT)
Dept: LAB | Facility: CLINIC | Age: 64
End: 2024-02-16
Payer: COMMERCIAL

## 2024-02-16 DIAGNOSIS — Z13.6 SCREENING FOR CARDIOVASCULAR CONDITION: ICD-10-CM

## 2024-02-16 DIAGNOSIS — Z12.5 SCREENING FOR PROSTATE CANCER: ICD-10-CM

## 2024-02-16 DIAGNOSIS — E53.8 VITAMIN B12 DEFICIENCY (NON ANEMIC): ICD-10-CM

## 2024-02-16 DIAGNOSIS — Z13.1 SCREENING FOR DIABETES MELLITUS: ICD-10-CM

## 2024-02-16 LAB
CHOLEST SERPL-MCNC: 177 MG/DL
FASTING STATUS PATIENT QL REPORTED: YES
FASTING STATUS PATIENT QL REPORTED: YES
GLUCOSE SERPL-MCNC: 121 MG/DL (ref 70–99)
HDLC SERPL-MCNC: 41 MG/DL
LDLC SERPL CALC-MCNC: 126 MG/DL
NONHDLC SERPL-MCNC: 136 MG/DL
PSA SERPL DL<=0.01 NG/ML-MCNC: 2.46 NG/ML (ref 0–4.5)
TRIGL SERPL-MCNC: 48 MG/DL
VIT B12 SERPL-MCNC: 296 PG/ML (ref 232–1245)

## 2024-02-16 PROCEDURE — 80061 LIPID PANEL: CPT

## 2024-02-16 PROCEDURE — 36415 COLL VENOUS BLD VENIPUNCTURE: CPT

## 2024-02-16 PROCEDURE — G0103 PSA SCREENING: HCPCS

## 2024-02-16 PROCEDURE — 82947 ASSAY GLUCOSE BLOOD QUANT: CPT

## 2024-02-16 PROCEDURE — 82607 VITAMIN B-12: CPT

## 2024-02-19 ENCOUNTER — TELEPHONE (OUTPATIENT)
Dept: PEDIATRICS | Facility: CLINIC | Age: 64
End: 2024-02-19
Payer: COMMERCIAL

## 2024-02-23 ENCOUNTER — OFFICE VISIT (OUTPATIENT)
Dept: PEDIATRICS | Facility: CLINIC | Age: 64
End: 2024-02-23
Attending: PEDIATRICS
Payer: COMMERCIAL

## 2024-02-23 VITALS
DIASTOLIC BLOOD PRESSURE: 86 MMHG | WEIGHT: 151.6 LBS | BODY MASS INDEX: 22.97 KG/M2 | TEMPERATURE: 97.9 F | HEART RATE: 71 BPM | SYSTOLIC BLOOD PRESSURE: 138 MMHG | RESPIRATION RATE: 18 BRPM | OXYGEN SATURATION: 97 % | HEIGHT: 68 IN

## 2024-02-23 DIAGNOSIS — F51.01 PRIMARY INSOMNIA: ICD-10-CM

## 2024-02-23 DIAGNOSIS — Z00.01 ENCOUNTER FOR GENERAL ADULT MEDICAL EXAMINATION WITH ABNORMAL FINDINGS: Primary | ICD-10-CM

## 2024-02-23 DIAGNOSIS — Z13.6 SCREENING FOR AAA (ABDOMINAL AORTIC ANEURYSM): ICD-10-CM

## 2024-02-23 DIAGNOSIS — E53.8 VITAMIN B12 DEFICIENCY (NON ANEMIC): ICD-10-CM

## 2024-02-23 DIAGNOSIS — Z12.11 SCREEN FOR COLON CANCER: ICD-10-CM

## 2024-02-23 DIAGNOSIS — Z13.1 SCREENING FOR DIABETES MELLITUS: ICD-10-CM

## 2024-02-23 DIAGNOSIS — Z87.891 HISTORY OF SMOKING: ICD-10-CM

## 2024-02-23 DIAGNOSIS — E78.5 HYPERLIPIDEMIA LDL GOAL <100: ICD-10-CM

## 2024-02-23 PROBLEM — R73.03 PREDIABETES: Status: ACTIVE | Noted: 2024-02-23

## 2024-02-23 LAB — HBA1C MFR BLD: 6 % (ref 0–5.6)

## 2024-02-23 PROCEDURE — 90480 ADMN SARSCOV2 VAC 1/ONLY CMP: CPT | Performed by: PEDIATRICS

## 2024-02-23 PROCEDURE — 90472 IMMUNIZATION ADMIN EACH ADD: CPT | Performed by: PEDIATRICS

## 2024-02-23 PROCEDURE — 83036 HEMOGLOBIN GLYCOSYLATED A1C: CPT | Performed by: PEDIATRICS

## 2024-02-23 PROCEDURE — 99397 PER PM REEVAL EST PAT 65+ YR: CPT | Mod: 25 | Performed by: PEDIATRICS

## 2024-02-23 PROCEDURE — 90662 IIV NO PRSV INCREASED AG IM: CPT | Performed by: PEDIATRICS

## 2024-02-23 PROCEDURE — 36415 COLL VENOUS BLD VENIPUNCTURE: CPT | Performed by: PEDIATRICS

## 2024-02-23 PROCEDURE — 91320 SARSCV2 VAC 30MCG TRS-SUC IM: CPT | Performed by: PEDIATRICS

## 2024-02-23 PROCEDURE — 99214 OFFICE O/P EST MOD 30 MIN: CPT | Mod: 25 | Performed by: PEDIATRICS

## 2024-02-23 PROCEDURE — 90471 IMMUNIZATION ADMIN: CPT | Performed by: PEDIATRICS

## 2024-02-23 PROCEDURE — 90677 PCV20 VACCINE IM: CPT | Performed by: PEDIATRICS

## 2024-02-23 RX ORDER — TRAZODONE HYDROCHLORIDE 100 MG/1
150 TABLET ORAL AT BEDTIME
Qty: 135 TABLET | Refills: 3 | Status: SHIPPED | OUTPATIENT
Start: 2024-02-23

## 2024-02-23 RX ORDER — ROSUVASTATIN CALCIUM 5 MG/1
5 TABLET, COATED ORAL DAILY
Qty: 90 TABLET | Refills: 3 | Status: SHIPPED | OUTPATIENT
Start: 2024-02-23

## 2024-02-23 SDOH — HEALTH STABILITY: PHYSICAL HEALTH: ON AVERAGE, HOW MANY DAYS PER WEEK DO YOU ENGAGE IN MODERATE TO STRENUOUS EXERCISE (LIKE A BRISK WALK)?: 4 DAYS

## 2024-02-23 ASSESSMENT — SOCIAL DETERMINANTS OF HEALTH (SDOH): HOW OFTEN DO YOU GET TOGETHER WITH FRIENDS OR RELATIVES?: ONCE A WEEK

## 2024-02-23 ASSESSMENT — PAIN SCALES - GENERAL: PAINLEVEL: NO PAIN (0)

## 2024-02-23 NOTE — PATIENT INSTRUCTIONS
Get tetanus shot at pharmacy    Start  new medication, rosuvastatin, for cholesterol. We will recheck you cholesterol in 2 months.    Preventive Care Advice   This is general advice given by our system to help you stay healthy. However, your care team may have specific advice just for you. Please talk to your care team about your preventive care needs.  Nutrition  Eat 5 or more servings of fruits and vegetables each day.  Try wheat bread, brown rice and whole grain pasta (instead of white bread, rice, and pasta).  Get enough calcium and vitamin D. Check the label on foods and aim for 100% of the RDA (recommended daily allowance).  Lifestyle  Exercise at least 150 minutes each week  (30 minutes a day, 5 days a week).  Do muscle strengthening activities 2 days a week. These help control your weight and prevent disease.  No smoking.  Wear sunscreen to prevent skin cancer.  Have a dental exam and cleaning every 6 months.  Yearly exams  See your health care team every year to talk about:  Any changes in your health.  Any medicines your care team has prescribed.  Preventive care, family planning, and ways to prevent chronic diseases.  Shots (vaccines)   HPV shots (up to age 26), if you've never had them before.  Hepatitis B shots (up to age 59), if you've never had them before.  COVID-19 shot: Get this shot when it's due.  Flu shot: Get a flu shot every year.  Tetanus shot: Get a tetanus shot every 10 years.  Pneumococcal, hepatitis A, and RSV shots: Ask your care team if you need these based on your risk.  Shingles shot (for age 50 and up)  General health tests  Diabetes screening:  Starting at age 35, Get screened for diabetes at least every 3 years.  If you are younger than age 35, ask your care team if you should be screened for diabetes.  Cholesterol test: At age 39, start having a cholesterol test every 5 years, or more often if advised.  Bone density scan (DEXA): At age 50, ask your care team if you should have  this scan for osteoporosis (brittle bones).  Hepatitis C: Get tested at least once in your life.  STIs (sexually transmitted infections)  Before age 24: Ask your care team if you should be screened for STIs.  After age 24: Get screened for STIs if you're at risk. You are at risk for STIs (including HIV) if:  You are sexually active with more than one person.  You don't use condoms every time.  You or a partner was diagnosed with a sexually transmitted infection.  If you are at risk for HIV, ask about PrEP medicine to prevent HIV.  Get tested for HIV at least once in your life, whether you are at risk for HIV or not.  Cancer screening tests  Cervical cancer screening: If you have a cervix, begin getting regular cervical cancer screening tests starting at age 21.  Breast cancer scan (mammogram): If you've ever had breasts, begin having regular mammograms starting at age 40. This is a scan to check for breast cancer.  Colon cancer screening: It is important to start screening for colon cancer at age 45.  Have a colonoscopy test every 10 years (or more often if you're at risk) Or, ask your provider about stool tests like a FIT test every year or Cologuard test every 3 years.  To learn more about your testing options, visit:   https://www.[employer]/667975.pdf.  For help making a decision, visit:   https://bit.ly/tc74560.  Prostate cancer screening test: If you have a prostate, ask your care team if a prostate cancer screening test (PSA) at age 55 is right for you.  Lung cancer screening: If you are a current or former smoker ages 50 to 80, ask your care team if ongoing lung cancer screenings are right for you.  For informational purposes only. Not to replace the advice of your health care provider. Copyright   2023 [employer]. All rights reserved. Clinically reviewed by the [facility]. [employer] 713234 - REV 01/24.

## 2024-02-23 NOTE — PROGRESS NOTES
Preventive Care Visit  [facility]  Delphine Ronquillo MD, Internal Medicine - Pediatrics  Feb 23, 2024    Assessment & Plan     (Z00.01) Encounter for general adult medical examination with abnormal findings  (primary encounter diagnosis)  Comment: covid, flu, pcv 20 today  Plan: td at pharmcy    (Z12.11) Screen for colon cancer  Comment:   Plan: Colonoscopy Screening  Referral            (F51.01) Primary insomnia  Comment: has cut back to 150mg nightly  Plan: traZODone (DESYREL) 100 MG tablet            (Z13.6) Screening for AAA (abdominal aortic aneurysm)  Comment:   Plan: Abdominal Aortic Aneurysm Screening/Tracking,         US Abdominal Aorta Limited            (Z87.891) History of smoking  Comment: cutting back  Plan: US Abdominal Aorta Limited          (E53.8) Vitamin B12 deficiency (non anemic)  Comment: stable - continue with B12, ok to stop other B complex  Plan:     (E78.5) Hyperlipidemia LDL goal <100  Comment: ASCVD risk 20% - start statin and recheck lipids/ALT in 2-3 months  Plan: rosuvastatin (CRESTOR) 5 MG tablet            (Z13.1) Screening for diabetes mellitus  Comment:   Plan: Hemoglobin A1c            Patient has been advised of split billing requirements and indicates understanding: Yes      Nicotine/Tobacco Cessation  He reports that he has been smoking cigarettes. He has a 9 pack-year smoking history. He has been exposed to tobacco smoke. He has never used smokeless tobacco.  Nicotine/Tobacco Cessation Plan  Information offered: Patient not interested at this time      Counseling  Appropriate preventive services were discussed with this patient, including applicable screening as appropriate for fall prevention, nutrition, physical activity, Tobacco-use cessation, weight loss and cognition.  Checklist reviewing preventive services available has been given to the patient.  Reviewed patient's diet, addressing concerns and/or questions.       See Patient  Instructions    Subjective   Hermelindo is a 64 year old, presenting for the following:  Physical (Fasting )        2/23/2024    10:02 AM   Additional Questions   Roomed by LILY Sin   Accompanied by LACEY         2/23/2024    10:02 AM   Patient Reported Additional Medications   Patient reports taking the following new medications NA         Health Care Directive  Patient does not have a Health Care Directive or Living Will: Discussed advance care planning with patient; information given to patient to review.    HPI    Smoking - has cut back to 1-2 cigarettes per day    BPH - patient was on terazosin 20mg daily - helped for about a month, then stopped.  Now off terazosin and able to manage.    Insomnia - patient previously at trazodone 200mg nightly.  He currently takes 1.5 tabs - can get about 6-7 hours of sleep.     Had labs done already: B12 ok, lipids below (needs statin), PSA ok, glucose prediabetes    The last few months patient has been taking better care of himself. Stopped working at [employer]. Doesn't feel as physically run down anymore. Used to get muscle cramps before bed (relived after 5 min of walking). Used to get electrical shocks. Both these symptoms have resolved since stopping.         Wt Readings from Last 3 Encounters:   02/23/24 68.8 kg (151 lb 9.6 oz)   02/16/23 65.4 kg (144 lb 3.2 oz)   03/31/21 66.5 kg (146 lb 8 oz)         The 10-year ASCVD risk score (Sierra WOOTEN, et al., 2019) is: 20.2%    Values used to calculate the score:      Age: 64 years      Sex: Male      Is Non- : No      Diabetic: No      Tobacco smoker: Yes      Systolic Blood Pressure: 138 mmHg      Is BP treated: No      HDL Cholesterol: 41 mg/dL      Total Cholesterol: 177 mg/dL              2/23/2024   General Health   How would you rate your overall physical health? Excellent   Feel stress (tense, anxious, or unable to sleep) Not at all         2/23/2024   Nutrition   Diet: Regular (no restrictions)          2/23/2024   Exercise   Days per week of moderate/strenous exercise 4 days         2/23/2024   Social Factors   Frequency of gathering with friends or relatives Once a week   Worry food won't last until get money to buy more No   Food not last or not have enough money for food? No   Do you have housing?  Yes   Are you worried about losing your housing? No   Lack of transportation? No   Unable to get utilities (heat,electricity)? No         2/23/2024   Fall Risk   Fallen 2 or more times in the past year? No   Trouble with walking or balance? No          2/23/2024   Activities of Daily Living- Home Safety   Needs help with the following daily activites None of the above   Safety concerns in the home None of the above         2/23/2024   Dental   Dentist two times every year? Yes         2/23/2024   Hearing Screening   Hearing concerns? None of the above         2/23/2024   Driving Risk Screening   Patient/family members have concerns about driving No         2/23/2024   General Alertness/Fatigue Screening   Have you been more tired than usual lately? No         2/23/2024   Urinary Incontinence Screening   Bothered by leaking urine in past 6 months No            Today's PHQ-2 Score:       2/23/2024     9:56 AM   PHQ-2 ( 1999 [employer])   Q1: Little interest or pleasure in doing things 0   Q2: Feeling down, depressed or hopeless 0   PHQ-2 Score 0   Q1: Little interest or pleasure in doing things Not at all   Q2: Feeling down, depressed or hopeless Not at all   PHQ-2 Score 0           2/23/2024   Substance Use   Alcohol more than 3/day or more than 7/wk No   Do you have a current opioid prescription? No   How severe/bad is pain from 1 to 10? 0/10 (No Pain)   Do you use any other substances recreationally? No     Social History     Tobacco Use    Smoking status: Every Day     Packs/day: 0.30     Years: 30.00     Additional pack years: 0.00     Total pack years: 9.00     Types: Cigarettes     Passive exposure: Current     Smokeless tobacco: Never    Tobacco comments:     1 cigarette every 2-3 weeks   Vaping Use    Vaping Use: Never used   Substance Use Topics    Alcohol use: Yes     Comment: rare    Drug use: No       Last PSA:   PSA   Date Value Ref Range Status   03/31/2021 1.24 0 - 4 ug/L Final     Comment:     Assay Method:  Chemiluminescence using Siemens Vista analyzer     Prostate Specific Antigen Screen   Date Value Ref Range Status   02/16/2024 2.46 0.00 - 4.50 ng/mL Final     ASCVD Risk   The 10-year ASCVD risk score (Sierra WOOTEN, et al., 2019) is: 20.2%    Values used to calculate the score:      Age: 64 years      Sex: Male      Is Non- : No      Diabetic: No      Tobacco smoker: Yes      Systolic Blood Pressure: 138 mmHg      Is BP treated: No      HDL Cholesterol: 41 mg/dL      Total Cholesterol: 177 mg/dL            Reviewed and updated as needed this visit by Provider                      Current providers sharing in care for this patient include:  Patient Care Team:  Delphine Ronquillo MD as PCP - General (Internal Medicine)  Sammi Lewis as Personal Advocate & Liaison (PAL)  Arpit Boyd MD as MD (Ophthalmology)  Luis Eduardo Sandhu OD as Referring Physician  Arpit Boyd MD as Assigned Surgical Provider  Delphine Ronquillo MD as Assigned PCP    The following health maintenance items are reviewed in Epic and correct as of today:  Health Maintenance   Topic Date Due    LUNG CANCER SCREENING  07/10/2012    Pneumococcal Vaccine: 65+ Years (2 of 2 - PCV) 07/13/2012    RSV VACCINE (Pregnancy & 60+) (1 - 1-dose 60+ series) Never done    INFLUENZA VACCINE (1) 09/01/2023    COVID-19 Vaccine (5 - 2023-24 season) 09/01/2023    COLORECTAL CANCER SCREENING  11/01/2023    DTAP/TDAP/TD IMMUNIZATION (3 - Td or Tdap) 01/20/2024    ANNUAL REVIEW OF HM ORDERS  02/16/2024    AORTIC ANEURYSM SCREENING (SYSTEM ASSIGNED)  02/18/2024    NICOTINE/TOBACCO CESSATION  "COUNSELING Q 1 YR  02/16/2024    MEDICARE ANNUAL WELLNESS VISIT  02/18/2024    FALL RISK ASSESSMENT  02/23/2025    ADVANCE CARE PLANNING  04/01/2026    GLUCOSE  02/16/2027    LIPID  02/16/2029    HEPATITIS C SCREENING  Completed    PHQ-2 (once per calendar year)  Completed    ZOSTER IMMUNIZATION  Completed    IPV IMMUNIZATION  Aged Out    HPV IMMUNIZATION  Aged Out    MENINGITIS IMMUNIZATION  Aged Out    RSV MONOCLONAL ANTIBODY  Aged Out    HIV SCREENING  Discontinued            Objective    Exam  /86   Pulse 71   Temp 97.9  F (36.6  C) (Oral)   Resp 18   Ht 1.727 m (5' 8\")   Wt 68.8 kg (151 lb 9.6 oz)   SpO2 97%   BMI 23.05 kg/m     Estimated body mass index is 23.05 kg/m  as calculated from the following:    Height as of this encounter: 1.727 m (5' 8\").    Weight as of this encounter: 68.8 kg (151 lb 9.6 oz).    Physical Exam  GENERAL: alert and no distress  EYES: Eyes grossly normal to inspection, PERRL and conjunctivae and sclerae normal  HENT: ear canals and TM's normal, nose and mouth without ulcers or lesions  NECK: no adenopathy, no asymmetry, masses, or scars  RESP: lungs clear to auscultation - no rales, rhonchi or wheezes  CV: regular rate and rhythm, normal S1 S2, no S3 or S4, no murmur, click or rub, no peripheral edema  ABDOMEN: soft, nontender, no hepatosplenomegaly, no masses and bowel sounds normal  MS: no gross musculoskeletal defects noted, no edema  SKIN: no suspicious lesions or rashes  NEURO: Normal strength and tone, mentation intact and speech normal  PSYCH: mentation appears normal, affect normal/bright        2/23/2024   Mini Cog   Clock Draw Score 2 Normal   3 Item Recall 3 objects recalled   Mini Cog Total Score 5             2/23/2024   Vision Screen   Reason Vision Screen Not Completed Patient had exam in last 12 months     Signed Electronically by: Delphine Ronquillo MD    "

## 2024-04-23 ENCOUNTER — LAB (OUTPATIENT)
Dept: LAB | Facility: CLINIC | Age: 64
End: 2024-04-23
Payer: COMMERCIAL

## 2024-04-23 DIAGNOSIS — E78.5 HYPERLIPIDEMIA LDL GOAL <100: ICD-10-CM

## 2024-04-23 LAB
ALT SERPL W P-5'-P-CCNC: 16 U/L (ref 0–70)
CHOLEST SERPL-MCNC: 92 MG/DL
FASTING STATUS PATIENT QL REPORTED: YES
HDLC SERPL-MCNC: 35 MG/DL
LDLC SERPL CALC-MCNC: 49 MG/DL
NONHDLC SERPL-MCNC: 57 MG/DL
TRIGL SERPL-MCNC: 42 MG/DL

## 2024-04-23 PROCEDURE — 84460 ALANINE AMINO (ALT) (SGPT): CPT

## 2024-04-23 PROCEDURE — 80061 LIPID PANEL: CPT

## 2024-04-23 PROCEDURE — 36415 COLL VENOUS BLD VENIPUNCTURE: CPT

## 2024-07-06 ENCOUNTER — HEALTH MAINTENANCE LETTER (OUTPATIENT)
Age: 64
End: 2024-07-06

## 2024-11-23 ENCOUNTER — HEALTH MAINTENANCE LETTER (OUTPATIENT)
Age: 64
End: 2024-11-23

## 2025-03-08 ENCOUNTER — HEALTH MAINTENANCE LETTER (OUTPATIENT)
Age: 65
End: 2025-03-08

## 2025-03-30 ENCOUNTER — HEALTH MAINTENANCE LETTER (OUTPATIENT)
Age: 65
End: 2025-03-30

## 2025-06-22 ENCOUNTER — HEALTH MAINTENANCE LETTER (OUTPATIENT)
Age: 65
End: 2025-06-22